# Patient Record
Sex: MALE | Race: WHITE | URBAN - METROPOLITAN AREA
[De-identification: names, ages, dates, MRNs, and addresses within clinical notes are randomized per-mention and may not be internally consistent; named-entity substitution may affect disease eponyms.]

---

## 2019-06-11 ENCOUNTER — HOSPITAL ENCOUNTER (INPATIENT)
Facility: HOSPITAL | Age: 29
LOS: 3 days | Discharge: ANOTHER HOSPITAL | DRG: 861 | End: 2019-06-14
Attending: PHYSICAL MEDICINE & REHABILITATION | Admitting: PHYSICAL MEDICINE & REHABILITATION
Payer: COMMERCIAL

## 2019-06-11 DIAGNOSIS — I10 HTN (HYPERTENSION): Primary | ICD-10-CM

## 2019-06-11 PROBLEM — B19.20 HCV (HEPATITIS C VIRUS): Status: ACTIVE | Noted: 2019-06-11

## 2019-06-11 PROBLEM — I48.91 A-FIB (HCC): Status: ACTIVE | Noted: 2019-06-11

## 2019-06-11 PROBLEM — Z98.890 HX OF TRACHEOSTOMY: Status: ACTIVE | Noted: 2019-06-11

## 2019-06-11 PROBLEM — M62.82 RHABDOMYOLYSIS: Status: ACTIVE | Noted: 2019-06-11

## 2019-06-11 PROBLEM — D75.839 THROMBOCYTOSIS: Status: ACTIVE | Noted: 2019-06-11

## 2019-06-11 PROBLEM — K68.12 PSOAS ABSCESS, RIGHT (HCC): Status: ACTIVE | Noted: 2019-06-11

## 2019-06-11 PROBLEM — D64.9 ANEMIA: Status: ACTIVE | Noted: 2019-06-11

## 2019-06-11 PROBLEM — R29.898 RIGHT LEG WEAKNESS: Status: ACTIVE | Noted: 2019-06-11

## 2019-06-11 PROBLEM — M79.604 RIGHT LEG PAIN: Status: ACTIVE | Noted: 2019-06-11

## 2019-06-11 PROBLEM — D72.829 LEUKOCYTOSIS: Status: ACTIVE | Noted: 2019-06-11

## 2019-06-11 PROCEDURE — 99255 IP/OBS CONSLTJ NEW/EST HI 80: CPT | Performed by: PHYSICAL MEDICINE & REHABILITATION

## 2019-06-11 RX ORDER — POLYETHYLENE GLYCOL 3350 17 G/17G
17 POWDER, FOR SOLUTION ORAL DAILY PRN
Status: DISCONTINUED | OUTPATIENT
Start: 2019-06-11 | End: 2019-06-13

## 2019-06-11 RX ORDER — HEPARIN SODIUM 5000 [USP'U]/ML
5000 INJECTION, SOLUTION INTRAVENOUS; SUBCUTANEOUS EVERY 8 HOURS SCHEDULED
Status: DISCONTINUED | OUTPATIENT
Start: 2019-06-11 | End: 2019-06-14 | Stop reason: HOSPADM

## 2019-06-11 RX ORDER — OXYCODONE HYDROCHLORIDE 5 MG/1
10 TABLET ORAL EVERY 4 HOURS PRN
Status: DISCONTINUED | OUTPATIENT
Start: 2019-06-11 | End: 2019-06-14 | Stop reason: HOSPADM

## 2019-06-11 RX ORDER — CLONIDINE 0.2 MG/24H
0.2 PATCH, EXTENDED RELEASE TRANSDERMAL WEEKLY
Status: DISCONTINUED | OUTPATIENT
Start: 2019-06-17 | End: 2019-06-14 | Stop reason: HOSPADM

## 2019-06-11 RX ORDER — OXYCODONE HYDROCHLORIDE 5 MG/1
5 TABLET ORAL EVERY 4 HOURS PRN
Status: DISCONTINUED | OUTPATIENT
Start: 2019-06-11 | End: 2019-06-14 | Stop reason: HOSPADM

## 2019-06-11 RX ORDER — GABAPENTIN 300 MG/1
300 CAPSULE ORAL 3 TIMES DAILY
Status: DISCONTINUED | OUTPATIENT
Start: 2019-06-11 | End: 2019-06-14 | Stop reason: HOSPADM

## 2019-06-11 RX ADMIN — OXYCODONE HYDROCHLORIDE 10 MG: 5 TABLET ORAL at 19:18

## 2019-06-11 RX ADMIN — GABAPENTIN 300 MG: 300 CAPSULE ORAL at 21:26

## 2019-06-11 RX ADMIN — METOPROLOL TARTRATE 25 MG: 25 TABLET, FILM COATED ORAL at 21:26

## 2019-06-11 RX ADMIN — HEPARIN SODIUM 5000 UNITS: 5000 INJECTION, SOLUTION INTRAVENOUS; SUBCUTANEOUS at 21:26

## 2019-06-11 RX ADMIN — DAPTOMYCIN 575 MG: 500 INJECTION, POWDER, LYOPHILIZED, FOR SOLUTION INTRAVENOUS at 20:11

## 2019-06-11 NOTE — ASSESSMENT & PLAN NOTE
- on clonidine 0 2 mg TD patch qweekly (arrived with patch dated 6/10)  - on lopressor 25 mg q12 (for a-fib)

## 2019-06-11 NOTE — PLAN OF CARE
Problem: Potential for Falls  Goal: Patient will remain free of falls  Description  INTERVENTIONS:  - Assess patient frequently for physical needs  -  Identify cognitive and physical deficits and behaviors that affect risk of falls    -  [place] fall precautions as indicated by assessment   - Educate patient/family on patient safety including physical limitations  - Instruct patient to call for assistance with activity based on assessment  - Modify environment to reduce risk of injury  - Consider OT/PT consult to assist with strengthening/mobility  Outcome: Progressing     Problem: PAIN - ADULT  Goal: Verbalizes/displays adequate comfort level or baseline comfort level  Description  Interventions:  - Encourage patient to monitor pain and request assistance  - Assess pain using appropriate pain scale  - Administer analgesics based on type and severity of pain and evaluate response  - Implement non-pharmacological measures as appropriate and evaluate response  - Consider cultural and social influences on pain and pain management  - Notify physician/advanced practitioner if interventions unsuccessful or patient reports new pain  Outcome: Progressing

## 2019-06-11 NOTE — ASSESSMENT & PLAN NOTE
-blood cx orderd by IM which is pending  - 16 2 per acute care records on 6/6; currently stable at 15 5   - with peritoneal & retroperitoneal / right iliopsoas abscesses   - and based on radiology report from [facility] of [address] A/P of 5/31 patient may have mediastinal abscess as well and FU CT chest was recommended however this was not done at [facility]; additionally the report mentions a possible rt empyema however per d/w Dr Krista Moreno of ID at [facility] patient had rt thoracentesis on 6/5 which did not grow any organisms in culture  - with MRSA bacteremia while in acute care   - s/p irrigation, evacuation, and drain placement on 5/8 by Dr Ev Pearson of iliopsosas abscess; per acute care records patient will need FU with IR for drain check approximately 6/25  - per d/w Dr Richard Mendoza of ID recommend continue with daptomycin 10 mg/kg/day until 6/28 (started on abx on 5/6 and confirmed with pharmacy that 10 mg/kg/day does not exceed the maximum dose) and check CT C/A/P to evaluate possible mediastinal abscess as well as known peritoneal & retroperitoneal abscesses (d/w radiology and recommends CT with both oral and IV contrast to evaluate for abscesses and patient currently with eGFR of 161) which revealed multiple lung/subpleural nodules, multiple fluid collections like infective located in the retroperitoneal, peritoneal, thoracic space (including but not limited to around the liver, heart, and lungs)--> discussed these findings with Dr Richard Mendoza of ID at [facility] and IM here at [address] and all are in agreement patient should be transferred back to [facility] acute care

## 2019-06-11 NOTE — ASSESSMENT & PLAN NOTE
-occurred in acute care and present on admission to rehab unit   - patient with 1/5 RLE strength proximally and right foot drop  - PROM dorsiflexion to a few degrees shy of neutral (ROM and multipodis boot to prevent further contracture)   - etiology likely multifactorial due to a combination of patient with RLE pain (primarily at the right foot) and is s/p irrigation, evacuation, and drain placement for a right psoas abscess and initially presented with rhabdomyolysis after being found down (likely was on his right side) having had a prolonged hospital course is likely deconditioned as well  -given that patient has an abscess in the illiopsoas region patient could also have a lumbar plexopathy as the etiology however MRI L spine of 5/26 at [facility] showed no disc herneations, central stenosis, foraminal stenosis L1 through S1 but report did note an "Abnormal enhancement extends adjacent to multiple right-sided lumbar neural foramina " therefore this finding was d/w the radiologist at [facility] who felt while there was no compression and no discrete fluid collection that could be drained there was evidence of that the aforementioned abscess associated fluid by extending to the lumbar neural foramina (particularly L2-L4) could be causing inflammation and patient's weakness   - t/c EMG/NCS if improvement does not occur (not available as inpt at this facility)

## 2019-06-11 NOTE — ASSESSMENT & PLAN NOTE
- evaluated by cardiology in acute care and started on lopressor 25 mg q12 but not AC based on MALI score of 0 per cards note from acute care  - d/w IM and while A-fib may have been provoked by illness in acute care recommend that patient be continued on lopressor 25 mg q12 at this time

## 2019-06-11 NOTE — ASSESSMENT & PLAN NOTE
- likely component of ABLA  - Hg 7 9 per acute care records on 6/6 currently stable at 8 4  -pending tx back to [facility] acute care for infxn managment

## 2019-06-11 NOTE — H&P
PHYSICAL MEDICINE AND REHABILITATION H&P/ADMISSION NOTE  Reynaldo Lee 25 y o  male MRN: [id number]  Unit/Bed#: [facility] 221-01 Encounter: [id number]     Rehab Diagnosis: debility     History of Present Illness:   Reynaldo Lee is a 25 y o  male who presented after being found down and was found to have rhabdomyolysis and TONY necessitating HD, additionally patient's course complicated by MRSA bacteremia and psoas abscess necessitating abx as well as irrigation, evacuation, and drain placement; patient also developed respiratory failure and had to be intubated and eventually had tracheostomy placed and was eventually decanulated  Subjective: Patient c/o right foot pain     Review of Systems: A 10-point review of systems was performed  Negative except as listed above  Plan:      Thrombocytosis ([address] )  Assessment & Plan  - likely reactive  - per acute care records 518 on 6/6; recheck in AM     Anemia  Assessment & Plan  - likely component of ABLA  - Hg 7 9 per acute care records on 6/6; recheck in AM     Leukocytosis  Assessment & Plan  - likely 2/2 to infxn  - 16 2 per acute care records on 6/6; recheck in AM     Right leg weakness  Assessment & Plan  - patient with 1/5 RLE strength proximally and right foot drop  -occurred during acute care and present on admission to rehab unit  - etiology likely multifactorial due to a combination of patient with RLE pain (primarily at the right foot) and is s/p irrigation, evacuation, and drain placement for a right psoas abscess and initially presented with rhabdomyolysis after being found down (likely was on his right side) having had a prolonged hospital course is likely deconditioned as well  - PROM dorsiflexion to a few degrees shy of neutral (ROM and multipodis boot to prevent further contracture)   -MRI L spine of 5/26 at [facility] showed no disc herneations, central stenosis, foraminal stenosis L1 through S1 however report did note an "Abnormal enhancement extends adjacent to multiple right-sided lumbar neural foramina ", additionally given that patient has an abscess in the illiopsoas region patient could also have a lumbar plexopathy as the etiology   - t/c EMG/NCS if does not improve (not available at this facility)     Right leg pain  Assessment & Plan  - primarily located at foot with neuropathic description of pain by patient   - unclear etiology but may have preceded hospitalization   - no obvious deformity, trophic changes, vasomotor abnormalities, or sudomotor abnormalities but patient does display allodynia and has weakness and decreased ROM at the ankle but no h/o of direct trauma to that area but did have a right psoas abscess for which underwent a procedure further proximally to that area; unclear if this is early CRPS vs a peripheral neuropathy vs a radicular pain syndrome vs lumbar plexopathy  -MRI L spine of 5/26 at [facility] showed no disc herneations, central stenosis, foraminal stenosis L1 through S1 however report did note an "Abnormal enhancement extends adjacent to multiple right-sided lumbar neural foramina ", additionally given that patient has an abscess in the illiopsoas region patient could also have a lumbar plexopathy as the etiology   - on gabapentin which was started in acute care and will uptitrate as needed  - also on prn oxycodone which was also started on acute care however after d/w patient this is of unclear benefit and given h/o substance abuse there is concern for drug seeking       HCV (hepatitis C virus)  Assessment & Plan  - seen by ID in acute care and recommended OP FU  - per acute care records AST/ALT/AP/total & direct bilirubin were 23/13/88/0 6/0 3 which are all WNL per their reference ranges  - will recheck in AM     Hx of tracheostomy  Assessment & Plan  - tracheostomy performed by Dr Toby Burleson on 5/9  - now decanulated   - site C/D/I    Rhabdomyolysis  Assessment & Plan  - occurred on presentation to acute care with TONY requiring HD however per acute care records renal fxn now with Cr of 0 5 and eGFR/GFR >60 and CK of 31 (wnl per acute care reference ranges)  - will recheck Cr, eGFR/GFR and CK in AM     Iliopsoas abscess, right ([facility] )  Assessment & Plan  - with MRSA bacteremia   - s/p irrigation, evacuation, and drain placement on 5/8 by Dr Payton Nicole; per acute care records patient will need FU with IR for drain check approximately 6/25  - per acute care notes daptomycin 10 mg/kg/day until 6/28 (started on abx on 5/6); confirmed with pharmacy that 10 mg/kg/day does not exceed the maximum dose   - per imaging report from acute care records patient may have other retroperitoneal & peritoneal abscesses and possibly mediastinal abscess as well, will d/w IM     A-fib ([facility] [address] [facility] )  Assessment & Plan  - occurred in the setting of illness in acute care   - evaluated by cardiology in acute care and started on lopressor 25 mg q12 but not AC based on MALI score of 0 per cards note from acute care  -will d/w IM     * HTN (hypertension)  Assessment & Plan  - on clonidine 0 2 mg TD patch qweekly (arrived with patch dated 6/10)  - on lopressor 25 mg q12 (for a-fib)  - IM managing     Drug regimen reviewed, all potential adverse effects identified and addressed:    Scheduled Meds:    Current Facility-Administered Medications:  [START ON 6/17/2019] cloNIDine 0 2 mg Transdermal Weekly Obdulia Baer MD   DAPTOmycin 10 mg/kg Intravenous Q24H Obdulia Baer MD   gabapentin 300 mg Oral TID Obdulia Baer MD   heparin (porcine) 5,000 Units Subcutaneous Q8H Samuel Alonso MD   metoprolol tartrate 25 mg Oral Q12H [address] Obdulia Baer MD   oxyCODONE 10 mg Oral Q4H PRN Obdulia Baer MD   oxyCODONE 5 mg Oral Q4H PRN Obdulia Baer MD   polyethylene glycol 17 g Oral Daily PRN Obdulia Baer MD              Functional History - Prior to Admission:       I PTA     Functional Status Upon Admission to ARC:  Mobility: mod  Transfers: mod  ADLs: mod     Social Hx:  Patient incarcerated      Physical Exam:          General: alert, no apparent distress, cooperative and comfortable  HEENT:  hx of tracheostomy s/p decanulation site C/D/I  CARDIAC:  +S1/2  LUNGS:  respirations unlabored  ABDOMEN:  + RLQ drain in place   EXTREMITIES:  no significant LE edema  NEURO:   mental status, speech normal, alert and oriented x3, cranial nerves 2-12 intact, sensation grossly normal, finger to nose and cerebellar exam normal and 4/5 UE B/L, 4/5 LLE, 1/5 RLE proximally with Rt foot drop  PSYCH:  mood/affect currently stable        Past Medical History:   Past Surgical History:   Family History:   Social history:   No past medical history on file  No past surgical history on file  No family history on file     Social History     Socioeconomic History    Marital status: Unknown     Spouse name: Not on file    Number of children: Not on file    Years of education: Not on file    Highest education level: Not on file   Occupational History    Not on file   Social Needs    Financial resource strain: Not on file    Food insecurity:     Worry: Not on file     Inability: Not on file    Transportation needs:     Medical: Not on file     Non-medical: Not on file   Tobacco Use    Smoking status: Not on file   Substance and Sexual Activity    Alcohol use: Not on file    Drug use: Not on file    Sexual activity: Not on file   Lifestyle    Physical activity:     Days per week: Not on file     Minutes per session: Not on file    Stress: Not on file   Relationships    Social connections:     Talks on phone: Not on file     Gets together: Not on file     Attends Buddhist service: Not on file     Active member of club or organization: Not on file     Attends meetings of clubs or organizations: Not on file     Relationship status: Not on file    Intimate partner violence:     Fear of current or ex partner: Not on file     Emotionally abused: Not on file     Physically abused: Not on file     Forced sexual activity: Not on file   Other Topics Concern    Not on file   Social History Narrative    Not on file          Current Medical Diagnosis Allergies   Patient Active Problem List   Diagnosis    HTN (hypertension)    A-fib ([facility] [address]    Psoas abscess, right ([facility] [address] )    Rhabdomyolysis    Hx of tracheostomy    HCV (hepatitis C virus)    Leukocytosis    Anemia    Thrombocytosis (HCC)    Right leg pain    Right leg weakness    Allergies   Allergen Reactions    Elavil [Amitriptyline]     Pepto-Bismol [Bismuth Subsalicylate] Hives    Restoril [Temazepam]            Medical Necessity Criteria for ARC Admission: Leukocytosis, abscess  In addition, the preadmission screen, post-admission physical evaluation, overall plan of care and admissions order demonstrate a reasonable expectation that the following criteria were met at the time of admission to the [facility]  1  The patient requires active and ongoing therapeutic intervention of multiple therapy disciplines (physical therapy, occupational therapy, speech-language pathology, or prosthetics/orthotics), one of which is physical or occupational therapy  2  Patient requires an intensive rehabilitation therapy program, as defined in Chapter 1, section 110 2 2 of the CMS Medicare Policy Manual  This intensive rehabilitation therapy program will consist of at least 3 hours of therapy per day at least 5 days per week or at least 15 hours of intensive rehabilitation therapy within a 7 consecutive day period, beginning with the date of admission to the [facility]  3  The patient is reasonably expected to actively participate in, and benefit significantly from, the intensive rehabilitation therapy program as defined in Chapter 1, section 110 2 2 of the CMS Medicare Policy Manual at this time of admission to the [facility]   He can reasonably be expected to make measurable improvement (that will be of practical value to improve the patients functional capacity or adaptation to impairments) as a result of the rehabilitation treatment, as defined in section 110 3, and such improvement can be expected to be made within the prescribed period of time  As noted in the CMS Medicare Policy Manual, the patient need not be expected to achieve complete independence in the domain of self-care nor be expected to return to his or her prior level of functioning in order to meet this standard  4  The patient must require physician supervision by a rehabilitation physician  As such, a rehabilitation physician will conduct face-to-face visits with the patient at least 3 days per week throughout the patients stay in the [facility] to assess the patient both medically and functionally, as well as to modify the course of treatment as needed to maximize the patients capacity to benefit from the rehabilitation process  5  The patient requires an intensive and coordinated interdisciplinary approach to providing rehabilitation, as defined in Chapter 1, section 110 2 5 of the CMS Medicare Policy Manual  This will be achieved through periodic team conferences, conducted at least once in a 7-day period, and comprising of an interdisciplinary team of medical professionals consisting of: a rehabilitation physician, registered nurse,  and/or , and a licensed/certified therapist from each therapy discipline involved in treating the patient  Changes Since Pre-admission Assessment: None -This patient's participation in rehab continues to be reasonable, necessary and appropriate  CMS Required Post-Admission Physician Evaluation Elements  History and Physical, including medical history, functional history and active comorbidities as in above text      PostAdmission Physician Evaluation:  The patient has the potential to make improvement and is in need of physical, occupational, and/or therapy services  The patient may also need nutritional services   Given the patient's complex medical condition and risk of further medical complications, rehabilitative services cannot be safely provided at a lower level of care, such as a skilled nursing facility  I have reviewed the patient's functional and medical status at the time of the preadmission screening and they are the same as on the day of this admission  I acknowledge that I have personally performed a full physical examination on this patient within 24 hours of admission   The patient and/or family demonstrated understanding the rehabilitation program and the discharge process after we discussed them      Agree in entirety: yes  Minor adaptions: none    Major changes: none     Gagandeep Breen MD  Physical Medicine and Rehabilitation

## 2019-06-11 NOTE — ASSESSMENT & PLAN NOTE
- likely reactive  - currently 80  -pending tx back to [facility] acute care for infxn managment

## 2019-06-11 NOTE — PROGRESS NOTES
06/11/19 1706   Charting Type   Charting Type Admission   Neurological   Neuro (WDL) X   Level of Consciousness Alert/awake   Orientation Level Oriented X4   Cognition Follows commands   Extraocular Movements Full   Right Upper Visual Fields Intact   Left Upper Visual Fields Intact   Right Lower Visual Fields Intact   Left Lower Visual Fields Intact   Facial Symmetry Symmetrical   Tongue Deviation Midline   Speech Clear; Appropriate for developmental age   [de-identified] Able to swallow solids and liquids without difficulty   Muscle Function/Sensation Assessment ;Muscle strength   R Hand  Moderate   L Hand  Moderate   RUE Muscle Strength 5- Normal strength   LUE Muscle Strength 5- Normal strength   RLE Muscle Strength 2- Movement with gravity eliminated   LLE Muscle Strength 5- Normal strength   HEENT   HEENT (WDL) X   Head and Face Symmetrical   R Eye Intact   L Eye Intact   R Ear Intact   L Ear Intact   Nose Intact   Lips Symmetrical   Throat Intact   Tongue Pink & moist   Voice Hoarse   Mucous Membrane(s) Moist;Pink; Intact   Teeth Missing teeth;Poor dental hygiene   Neck Trauma/injury  (old trach opening)   Respiratory   Respiratory (WDL) X   Respiratory Pattern Normal   Chest Assessment Chest expansion symmetrical   Bilateral Breath Sounds Clear;Diminished   R Breath Sounds Clear;Diminished   L Breath Sounds Clear;Diminished   Cardiac   Cardiac (WDL) WDL   Peripheral Vascular   Peripheral Vascular (WDL) WDL   Integumentary   Integumentary (WDL) X   Skin Condition/Temp Dry   Skin Integrity Cracking   Skin Location B/L feet   Skin Turgor Non-tenting   Integumentary Additional Assessments No   2 RN Skin Assessment completed with Tianna AVILES  Tattoos/Piercings   Does patient have tattoos?  No   Piercings Remaining No   Bj Scale   Sensory Perceptions 3   Moisture 4   Activity 2   Mobility 3   Nutrition 3   Friction and Shear 1   Bj Scale Score 16   Wound 06/11/19 Pressure Injury Sacrum   Date First Assessed/Time First Assessed: 06/11/19 1800   Pre-Existing Wound: Yes  Primary Wound Type: Pressure Injury  Location: Sacrum  Wound Description (Comments): red, nonblanchable   Wound Description Beefy red;Non-blanchable erythema   Staging Stage I   Eleanor-wound Assessment Clean;Dry; Intact   Wound Length (cm) 4 cm   Wound Width (cm) 4 cm   Wound Depth (cm) 0   Calculated Wound Area (cm^2) 16 cm^2   Calculated Wound Volume (cm^3) 0 cm^3   Drainage Amount None   Treatments Cleansed   Dressing Foam, Silicon (eg  Allevyn, etc)   Dressing Changed Changed   Patient Tolerance Tolerated well   Dressing Status Clean;Dry; Intact   Wound 06/11/19 Pressure Injury Heel Right   Date First Assessed/Time First Assessed: 06/11/19 1800   Pre-Existing Wound: Yes  Primary Wound Type: Pressure Injury  Location: Heel  Wound Location Orientation: Right  Wound Description (Comments): SDTI   Wound Description Beefy red;Dark edges   Staging Deep Tissue Injury   Wound Length (cm) 3 5 cm   Wound Width (cm) 3 5 cm   Wound Depth (cm) 0   Calculated Wound Area (cm^2) 12 25 cm^2   Calculated Wound Volume (cm^3) 0 cm^3   Drainage Amount None   Treatments Cleansed;Elevated   Dressing Protective barrier   Patient Tolerance Tolerated well   Wound 06/11/19 Incision Catheter entry/exit site Abdomen Right; Lower;Quadrant   Date First Assessed/Time First Assessed: 06/11/19 1800   Pre-Existing Wound: Yes  Primary Wound Type: Incision  Traumatic Wound Type: Catheter entry/exit site  Location: Abdomen  Wound Location Orientation: Right; Lower;Quadrant   Wound Description Beefy red   Eleanor-wound Assessment Clean;Dry; Intact   Closure Surface sutures   Drainage Amount Scant   Drainage Description Serosanguineous   Treatments Cleansed   Dressing Dry dressing   Dressing Changed Changed   Patient Tolerance Tolerated well   Dressing Status Clean;Dry; Intact   Musculoskeletal   Musculoskeletal (WDL) X   Level of Assistance Moderate assist, patient does 50-74%   RUE Full movement   LUE Full movement   RLE Limited movement   LLE Full movement   Gastrointestinal   Gastrointestinal (WDL) WDL   Last BM Date 06/10/19   Admission Bowel Assessment   Usual Pattern Every other day   Medications used at home None   Problems prior to admission? No   Bowel Shift Assessment   Assistance Needed None   Bowel Management Moderate assistance   Genitourinary   Genitourinary (WDL) WDL   Admission Bladder Assessment   Bladder Problems Prior to Admission? No   Bladder History No problems   Bladder Device Used at Home None   Bladder Shift Assessment   Bladder Device Urinal   Bladder Incontinence No   Bladder Management Minimal assistance   Anal/Rectal   Anal/Rectal (WDL) WDL   Psychosocial   Psychosocial (WDL) WDL   Orientated to facility  Call bell reviewed and questions addressed

## 2019-06-11 NOTE — ASSESSMENT & PLAN NOTE
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

## 2019-06-11 NOTE — ASSESSMENT & PLAN NOTE
- primarily located at foot with neuropathic description of pain by patient   - unclear etiology but may have preceded hospitalization   - no obvious deformity, trophic changes, vasomotor abnormalities, or sudomotor abnormalities but patient does display allodynia and has weakness and decreased ROM at the ankle but no h/o of direct trauma to that area but did have a right psoas abscess for which underwent a procedure further proximally to that area; unclear if this is early CRPS vs a peripheral neuropathy vs a radicular pain syndrome vs lumbar plexopathy  -MRI L spine of 5/26 at [facility] showed no disc herneations, central stenosis, foraminal stenosis L1 through S1 but report did note an "Abnormal enhancement extends adjacent to multiple right-sided lumbar neural foramina " therefore this finding was d/w the radiologist at [facility] who felt while there was no compression and no discrete fluid collection that could be drained there was evidence of that the aforementioned abscess associated fluid by extending to the lumbar neural foramina (particularly L2-L4) could be causing inflammation and patient's pain  - on gabapentin which was started in acute care and will uptitrate as needed  - also on prn oxycodone which was also started on acute care however after d/w patient this is of unclear benefit and given h/o substance abuse there is concern for drug seeking

## 2019-06-11 NOTE — ASSESSMENT & PLAN NOTE
- seen by ID in acute care and recommended OP FU  - per acute care records AST/ALT/AP/total & direct bilirubin were 23/13/88/0 6/0 3 which are all WNL per their reference ranges  - recheck on 6/12 revealed AST/ALT/AP/total & direct bilirubin were all WNL

## 2019-06-12 ENCOUNTER — APPOINTMENT (INPATIENT)
Dept: CT IMAGING | Facility: HOSPITAL | Age: 29
DRG: 861 | End: 2019-06-12
Payer: COMMERCIAL

## 2019-06-12 PROBLEM — R50.9 FEVER: Status: ACTIVE | Noted: 2019-06-11

## 2019-06-12 PROBLEM — K68.19: Status: ACTIVE | Noted: 2019-06-11

## 2019-06-12 PROBLEM — R74.8 ELEVATED LIPASE: Status: ACTIVE | Noted: 2019-06-12

## 2019-06-12 PROBLEM — E83.42 HYPOMAGNESEMIA: Status: ACTIVE | Noted: 2019-06-12

## 2019-06-12 LAB
ALBUMIN SERPL BCP-MCNC: 2.6 G/DL (ref 3.5–5.7)
ALP SERPL-CCNC: 69 U/L (ref 40–150)
ALT SERPL W P-5'-P-CCNC: 8 U/L (ref 7–52)
ANION GAP SERPL CALCULATED.3IONS-SCNC: 7 MMOL/L (ref 4–13)
ANISOCYTOSIS BLD QL SMEAR: PRESENT
AST SERPL W P-5'-P-CCNC: 13 U/L (ref 13–39)
BASOPHILS # BLD AUTO: 0.2 THOUSANDS/ΜL (ref 0–0.1)
BASOPHILS NFR BLD AUTO: 1 % (ref 0–2)
BILIRUB DIRECT SERPL-MCNC: 0.1 MG/DL (ref 0–0.2)
BILIRUB SERPL-MCNC: 0.3 MG/DL (ref 0.2–1)
BUN SERPL-MCNC: 9 MG/DL (ref 7–25)
CALCIUM SERPL-MCNC: 9.2 MG/DL (ref 8.6–10.5)
CHLORIDE SERPL-SCNC: 96 MMOL/L (ref 98–107)
CK SERPL-CCNC: 15 U/L (ref 30–308)
CO2 SERPL-SCNC: 31 MMOL/L (ref 21–31)
CREAT SERPL-MCNC: 0.39 MG/DL (ref 0.7–1.3)
EOSINOPHIL # BLD AUTO: 0.3 THOUSAND/ΜL (ref 0–0.61)
EOSINOPHIL NFR BLD AUTO: 2 % (ref 0–5)
ERYTHROCYTE [DISTWIDTH] IN BLOOD BY AUTOMATED COUNT: 17.8 % (ref 11.5–14.5)
GFR SERPL CREATININE-BSD FRML MDRD: 161 ML/MIN/1.73SQ M
GLUCOSE SERPL-MCNC: 87 MG/DL (ref 65–99)
HCT VFR BLD AUTO: 25.6 % (ref 42–47)
HGB BLD-MCNC: 8.4 G/DL (ref 14–18)
HYPERCHROMIA BLD QL SMEAR: PRESENT
LYMPHOCYTES # BLD AUTO: 1.7 THOUSANDS/ΜL (ref 0.6–4.47)
LYMPHOCYTES NFR BLD AUTO: 11 % (ref 21–51)
MAGNESIUM SERPL-MCNC: 1.8 MG/DL (ref 1.9–2.7)
MCH RBC QN AUTO: 29 PG (ref 26–34)
MCHC RBC AUTO-ENTMCNC: 32.7 G/DL (ref 31–37)
MCV RBC AUTO: 89 FL (ref 81–99)
MONOCYTES # BLD AUTO: 1.1 THOUSAND/ΜL (ref 0.17–1.22)
MONOCYTES NFR BLD AUTO: 7 % (ref 2–12)
NEUTROPHILS # BLD AUTO: 12.2 THOUSANDS/ΜL (ref 1.4–6.5)
NEUTS SEG NFR BLD AUTO: 79 % (ref 42–75)
PLATELET # BLD AUTO: 609 THOUSANDS/UL (ref 149–390)
PLATELET BLD QL SMEAR: ABNORMAL
PMV BLD AUTO: 8.2 FL (ref 8.6–11.7)
POTASSIUM SERPL-SCNC: 3.9 MMOL/L (ref 3.5–5.5)
PROT SERPL-MCNC: 8.8 G/DL (ref 6.4–8.9)
RBC # BLD AUTO: 2.88 MILLION/UL (ref 4.3–5.9)
RBC MORPH BLD: PRESENT
SODIUM SERPL-SCNC: 134 MMOL/L (ref 134–143)
WBC # BLD AUTO: 15.5 THOUSAND/UL (ref 4.8–10.8)

## 2019-06-12 PROCEDURE — 97110 THERAPEUTIC EXERCISES: CPT

## 2019-06-12 PROCEDURE — 85025 COMPLETE CBC W/AUTO DIFF WBC: CPT | Performed by: PHYSICAL MEDICINE & REHABILITATION

## 2019-06-12 PROCEDURE — 99232 SBSQ HOSP IP/OBS MODERATE 35: CPT | Performed by: PHYSICAL MEDICINE & REHABILITATION

## 2019-06-12 PROCEDURE — 97162 PT EVAL MOD COMPLEX 30 MIN: CPT

## 2019-06-12 PROCEDURE — 99255 IP/OBS CONSLTJ NEW/EST HI 80: CPT | Performed by: INTERNAL MEDICINE

## 2019-06-12 PROCEDURE — 97530 THERAPEUTIC ACTIVITIES: CPT

## 2019-06-12 PROCEDURE — 87040 BLOOD CULTURE FOR BACTERIA: CPT | Performed by: INTERNAL MEDICINE

## 2019-06-12 PROCEDURE — 80053 COMPREHEN METABOLIC PANEL: CPT | Performed by: PHYSICAL MEDICINE & REHABILITATION

## 2019-06-12 PROCEDURE — 97542 WHEELCHAIR MNGMENT TRAINING: CPT

## 2019-06-12 PROCEDURE — 82248 BILIRUBIN DIRECT: CPT | Performed by: PHYSICAL MEDICINE & REHABILITATION

## 2019-06-12 PROCEDURE — 97167 OT EVAL HIGH COMPLEX 60 MIN: CPT

## 2019-06-12 PROCEDURE — 71260 CT THORAX DX C+: CPT

## 2019-06-12 PROCEDURE — 82550 ASSAY OF CK (CPK): CPT | Performed by: PHYSICAL MEDICINE & REHABILITATION

## 2019-06-12 PROCEDURE — 97535 SELF CARE MNGMENT TRAINING: CPT

## 2019-06-12 PROCEDURE — 74177 CT ABD & PELVIS W/CONTRAST: CPT

## 2019-06-12 PROCEDURE — 83735 ASSAY OF MAGNESIUM: CPT | Performed by: PHYSICAL MEDICINE & REHABILITATION

## 2019-06-12 RX ADMIN — OXYCODONE HYDROCHLORIDE 10 MG: 5 TABLET ORAL at 01:05

## 2019-06-12 RX ADMIN — METOPROLOL TARTRATE 25 MG: 25 TABLET, FILM COATED ORAL at 08:34

## 2019-06-12 RX ADMIN — OXYCODONE HYDROCHLORIDE 10 MG: 5 TABLET ORAL at 16:06

## 2019-06-12 RX ADMIN — ALTEPLASE 2 MG: 2.2 INJECTION, POWDER, LYOPHILIZED, FOR SOLUTION INTRAVENOUS at 15:27

## 2019-06-12 RX ADMIN — IOHEXOL 50 ML: 240 INJECTION, SOLUTION INTRATHECAL; INTRAVASCULAR; INTRAVENOUS; ORAL at 16:00

## 2019-06-12 RX ADMIN — OXYCODONE HYDROCHLORIDE 10 MG: 5 TABLET ORAL at 05:27

## 2019-06-12 RX ADMIN — HEPARIN SODIUM 5000 UNITS: 5000 INJECTION, SOLUTION INTRAVENOUS; SUBCUTANEOUS at 14:21

## 2019-06-12 RX ADMIN — GABAPENTIN 300 MG: 300 CAPSULE ORAL at 20:21

## 2019-06-12 RX ADMIN — METOPROLOL TARTRATE 25 MG: 25 TABLET, FILM COATED ORAL at 20:21

## 2019-06-12 RX ADMIN — GABAPENTIN 300 MG: 300 CAPSULE ORAL at 16:06

## 2019-06-12 RX ADMIN — HEPARIN SODIUM 5000 UNITS: 5000 INJECTION, SOLUTION INTRAVENOUS; SUBCUTANEOUS at 05:15

## 2019-06-12 RX ADMIN — IOHEXOL 100 ML: 350 INJECTION, SOLUTION INTRAVENOUS at 18:57

## 2019-06-12 RX ADMIN — GABAPENTIN 300 MG: 300 CAPSULE ORAL at 08:34

## 2019-06-12 RX ADMIN — OXYCODONE HYDROCHLORIDE 10 MG: 5 TABLET ORAL at 10:57

## 2019-06-12 RX ADMIN — OXYCODONE HYDROCHLORIDE 10 MG: 5 TABLET ORAL at 20:30

## 2019-06-12 RX ADMIN — HEPARIN SODIUM 5000 UNITS: 5000 INJECTION, SOLUTION INTRAVENOUS; SUBCUTANEOUS at 21:09

## 2019-06-12 RX ADMIN — DAPTOMYCIN 575 MG: 500 INJECTION, POWDER, LYOPHILIZED, FOR SOLUTION INTRAVENOUS at 20:21

## 2019-06-12 NOTE — ASSESSMENT & PLAN NOTE
- per acute care records 1 2 on 5/18; recheck 1 8 however will defer to [address] to start Mg supplementation as patient is pending tx back to [facility] for management of infxn

## 2019-06-12 NOTE — TREATMENT PLAN
Individualized Plan of [address] 25 y o  male MRN: [id number]  Unit/Bed#: -01 Encounter: [id number]     PATIENT INFORMATION  ADMISSION DATE: 6/11/2019  5:25 PM JOSH CATEGORY: debility    ADMISSION DIAGNOSIS: Neurological symptoms [R29 90]  EXPECTED LOS: 1-2 wks      MEDICAL/FUNCTIONAL PROGNOSIS  Based on my assessment of the patient's medical conditions and current functional status, the prognosis for attaining medical and functional goals or the IRF stay is:  fair    Medical Goals: pain control     ANTICIPATED DISCHARGE DISPOSITION AND SERVICES  COMMUNITY SETTING: PT/OT/Speech     ANTICIPATED FOLLOW-UP SERVICE:   Outpatient Therapy Services: PT/OT    DISCIPLINE SPECIFIC PLANS:  Required Disciplines & Services: PT/OT    REQUIRED THERAPY:  Therapy Hours per Day Days per Week Total Days   Physical Therapy 1 5 5 10   Occupational Therapy 1 5 5 10   Speech/Language Therapy 0 0 0   NOTE: Additional therapy time(s) may be added as appropriate to meet patient needs and to achieve functional goals          ANTICIPATED FUNCTIONAL OUTCOMES:  ADL:   Patient will maximize level for ADLs with least restrictive device upon completion of the rehab program     Bladder/Bowel:   Patient will maximize level for bladder/bowel managment with least restrictive device upon completion of the rehab program     Transfers:   Patient will maximize level for transfers with least restrictive device upon completion of the rehab program     Locomotion:   Patient will maximize level for locomotion with least restrictive device upon completion of the rehab program     Cognitive:  patient appears to be at baseline cognitive fxn     DISCHARGE PLANNING NEEDS  Equipment needs: tbd       REHAB ANTICIPATED PARTICIPATION RESTRICTIONS:  None

## 2019-06-12 NOTE — PROGRESS NOTES
OT eval and ADL  OT treatment and documentation completed in collaboration with LARISSA Harper  Pt completed session with w/c mobility to OT gym S and with BUE therex during 27 minutes concurrent treatment focusing on similar goals of w/c mobility and UB therex as another patient with similar goals  Pt completes 30 hand squeezes with gripper and 30 up/ down yellow flex bar BUE for both activities  Pt presents following hospitalization due to rhabdo with decreased balance, strength, endurance and increased RLE pain noted  Pt will benefit from skilled OT services to increase independence with daily tasks  Precautions include contact for MRSA, PICC LUE, drain to Right lower quadrant and increased pain RLE limiting WB     06/12/19 1230   Patient Data   Rehab Impairment neurological disorder   Etiologic Diagnosis rhabdomyolysis   Home Setup   Type of Home Other  ([address] inmate)   Home Modification Comment Modifications will beneed to be addressed  Toilet in cell however will need to go to communal shower for bathing  Prior IADL Participation   Money Management   (custodial completes)   Meal Preparation   (custodial completes)   Laundry   (custodial completes)   Home Cleaning   (custodial completes)   Prior Level of Function   Self-Care 3  Independent - Patient completed the activities by him/herself, with or without an assistive device, with no assistance from a helper  Patient Preference   Nickname (Patient Preference) Georgeana Goodpasture   Restrictions/Precautions   Precautions Cognitive; Fall Risk;Limb alert;Multiple lines;Contact/isolation   Weight Bearing Restrictions No   Pain Assessment   Pain Assessment 0-10   Pain Score 8   Pain Type Acute pain   Pain Location Leg   Pain Orientation Right   QI: Eating   Assistance Needed Set-up / clean-up   Eating CARE Score 5   QI: Oral Hygiene   Assistance Needed Set-up / clean-up   Oral Hygiene CARE Score 5   Grooming   Able To Initiate Tasks;Comb/Brush Hair;Wash/Dry Face;Brush/Clean Teeth;Wash/Dry Hands   Limitation Noted In Safety   Grooming (FIM) 5 - [place] sets up supplies or applies device   QI: Shower/Bathe Self   Assistance Needed Physical assistance   Assistance Provided by [place] 25%-49%   Shower/Bathe Self CARE Score 3   Bathing   Assessed Bath Style Sponge Bath   Anticipated D/C Bath Style Shower;Sponge Bath   Able to [place] No   Able to [employer] Temperature No   Able to Wash/Rinse/Dry (body part) Left Arm;Right Arm;L Upper Leg;R Upper Leg;Chest;Abdomen;Perineal Area   Limitations Noted in Balance; Endurance; Safety   Positioning Supine   Bathing (FIM) 3 - Patient completes 5/10  6/10 or 7/10 parts   QI: Upper Body Dressing   Assistance Needed Physical assistance   Assistance Provided by [place] Less than 25%   Upper Body Dressing CARE Score 3   QI: Lower Body Dressing   Assistance Needed Physical assistance   Assistance Provided by [place] 75% or more   Lower Body Dressing CARE Score 2   QI: Putting On/Taking Off Footwear   Assistance Needed Physical assistance   Assistance Provided by [place] 50%-74%   Putting On/Taking Off Footwear CARE Score 2   QI: Picking Up Object   Reason if not Attempted Safety concerns   Picking Up Object CARE Score 88   Dressing/Undressing Clothing   Remove UB Clothes Pullover Shirt   Remove LB Clothes Pants;Socks   [address] LB Clothes Pants;Socks   Limitations Noted In Balance; Endurance; Safety;ROM;Strength; Other  (pain RLE)   UB Dressing (FIM) 4 - Patient completes 75% of all tasks   LB Dressing (FIM) 2 - Patient completes 25-49% of all tasks   QI: [address] CARE Score 4   Toileting   Toileting (FIM) 5 - [place] sets up supplies or applies device  (urinal use)   Bed Mobility   Able to Roll Left to Right;Right to Left   Findings Min A   Transfer Bed/Chair/Wheelchair   Limitations Noted In Balance;Pain Management; Endurance;LE Strength   Sit Pivot Moderate Assist Supine to Sit Moderate Assist   Sit to Supine Moderate Assist   Bed, Chair, Wheelchair Transfer (FIM) 3 - Patient completes 50 - 74% of all tasks   QI: Toilet Transfer   Reason if not Attempted Refused to perform   Toilet Transfer CARE Score 7   Tub/Shower Transfer   Not Assessed Sponge Bath;Medical   RUE Assessment   RUE Assessment WFL  (4-/5 grossly sh to hand)   LUE Assessment   LUE Assessment WFL  (4-/5 grossly sh to hand)   Coordination   Movements are Fluid and Coordinated 1   Sensation   Light Touch No apparent deficits   Propioception No apparent deficits   Cognition   Overall Cognitive Status Impaired   Arousal/Participation Alert; Responsive; Cooperative   Attention Attends with cues to redirect   Orientation Level Oriented X4   Discharge Information   Patient's Discharge Plan Return to detention   OT Therapy Minutes   OT Time In 1230   OT Time Out 1400   OT Total Time (minutes) 90   OT Mode of treatment - Individual (minutes) 63   OT Mode of treatment - Concurrent (minutes) 27

## 2019-06-12 NOTE — ASSESSMENT & PLAN NOTE
· Prolonged and complicated previous hospital course, MRSA infection resistant to vanco, currently on dapto through 6/25, s/p drain, with multiple other abscesses noted on chart    Current Antibiotics:   Dapto 5/6 - current    Prior Antibiotics:   Vanco IV 4/21 - 4/25  Zosyn IV 4/21   Dapto IV 4/26 - 5/4  Ceftaroline IV 4/30 - 5/4  Ceftaroline 5/6 - 5/15    · Patient with fever and leukocytosis currently  I have ordered repeat blood cultures, however I will defer to Infectious Disease regarding any further recommendations in light of patient's complicated course   Case discussed with Primary Team

## 2019-06-12 NOTE — CASE MANAGEMENT
Initial assessment & orientation to [facility]  Pt is a prisoner & is MA pending  He is serving a short sentence & will likely be able to be released from prison shortly after DC from [facility]  His home plan is to a detention house  SW will maintain contact with prison care coordinator Pat Diaz regarding Tx & DC planning, and will assist as needed

## 2019-06-12 NOTE — PROGRESS NOTES
Physical Medicine and Rehabilitation Progress Note  Dominic Crowe 25 y o  male MRN: [id number]  Unit/Bed#: -01 Encounter: [id number]    HPI: Dominic Crowe is a 25 y o  male who presented after being found down and was found to have rhabdomyolysis and TONY necessitating HD, additionally patient's course complicated by MRSA bacteremia and psoas abscess necessitating abx as well as irrigation, evacuation, and drain placement; patient also developed respiratory failure and had to be intubated and eventually had tracheostomy placed and was eventually decanulated            Chief Complaint: debility     Interval/subjective: patient denies any events overnight     ROS: A 10 point ROS was performed; negative except as noted above       Assessment/Plan:      Hypomagnesemia  Assessment & Plan  - per acute care records 1 2 on 5/18; recheck pending     Thrombocytosis ([facility] [address] )  Assessment & Plan  - likely reactive  - currently 609  - IM monitoring     Anemia  Assessment & Plan  - likely component of ABLA  - Hg 7 9 per acute care records on 6/6 currently stable at 8 4  -IM monitoring     Elevated lipase  Assessment & Plan  - no amylase found in records from acute care   - noted to be elevated to 147 (uln 60 at [facility])   - pateint currently asymptomatic; will d/w IM     Right leg weakness  Assessment & Plan  -occurred in acute care and present on admission to rehab unit   - patient with 1/5 RLE strength proximally and right foot drop  - PROM dorsiflexion to a few degrees shy of neutral (ROM and multipodis boot to prevent further contracture)   - etiology likely multifactorial due to a combination of patient with RLE pain (primarily at the right foot) and is s/p irrigation, evacuation, and drain placement for a right psoas abscess and initially presented with rhabdomyolysis after being found down (likely was on his right side) having had a prolonged hospital course is likely deconditioned as well  -given that patient has an abscess in the illiopsoas region patient could also have a lumbar plexopathy as the etiology however MRI L spine of 5/26 at [facility] showed no disc herneations, central stenosis, foraminal stenosis L1 through S1 but report did note an "Abnormal enhancement extends adjacent to multiple right-sided lumbar neural foramina " therefore this finding was d/w the radiologist at [facility] who felt while there was no compression and no discrete fluid collection that could be drained there was evidence of that the aforementioned abscess associated fluid by extending to the lumbar neural foramina (particularly L2-L4) could be causing inflammation and patient's weakness   - t/c EMG/NCS if improvement does not occur (not available as inpt at this facility)     Right leg pain  Assessment & Plan  - primarily located at foot with neuropathic description of pain by patient   - unclear etiology but may have preceded hospitalization   - no obvious deformity, trophic changes, vasomotor abnormalities, or sudomotor abnormalities but patient does display allodynia and has weakness and decreased ROM at the ankle but no h/o of direct trauma to that area but did have a right psoas abscess for which underwent a procedure further proximally to that area; unclear if this is early CRPS vs a peripheral neuropathy vs a radicular pain syndrome vs lumbar plexopathy  -MRI L spine of 5/26 at [facility] showed no disc herneations, central stenosis, foraminal stenosis L1 through S1 but report did note an "Abnormal enhancement extends adjacent to multiple right-sided lumbar neural foramina " therefore this finding was d/w the radiologist at [facility] who felt while there was no compression and no discrete fluid collection that could be drained there was evidence of that the aforementioned abscess associated fluid by extending to the lumbar neural foramina (particularly L2-L4) could be causing inflammation and patient's pain  - on gabapentin which was started in acute care and will uptitrate as needed  - also on prn oxycodone which was also started on acute care however after d/w patient this is of unclear benefit and given h/o substance abuse there is concern for drug seeking       HCV (hepatitis C virus)  Assessment & Plan  - seen by ID in acute care and recommended OP FU  - per acute care records AST/ALT/AP/total & direct bilirubin were 23/13/88/0 6/0 3 which are all WNL per their reference ranges  - recheck on 6/12 revealed AST/ALT/AP/total & direct bilirubin were all WNL     Hx of tracheostomy  Assessment & Plan  - tracheostomy performed by Dr Robe Pichardo on 5/9  - now decanulated   - site C/D/I    Rhabdomyolysis  Assessment & Plan  - resolved   - renal fxn WNL   - CK now actually below LLN of 30 at 15 which is likely 2/2 to disuse atrophy     Fever  Assessment & Plan  - 16 2 per acute care records on 6/6; currently stable at 15 5   - with peritoneal & retroperitoneal / right iliopsoas abscesses   - and based on radiology report from [facility] of [address] A/P of 5/31 patient may have mediastinal abscess as well and FU CT chest was recommended however this was not done at [facility]; additionally the report mentions a possible rt empyema however per d/w Dr Trista Brizuela of ID at [facility] patient had rt thoracentesis on 6/5 which did not grow any organisms in culture  - with MRSA bacteremia while in acute care   - s/p irrigation, evacuation, and drain placement on 5/8 by Dr Carlos Street of iliopsosas abscess; per acute care records patient will need FU with IR for drain check approximately 6/25  - per d/w Dr Jad Arteaga of ID recommend continue with daptomycin 10 mg/kg/day until 6/28 (started on abx on 5/6 and confirmed with pharmacy that 10 mg/kg/day does not exceed the maximum dose) and check CT C/A/P to evaluate possible mediastinal abscess as well as known peritoneal & retroperitoneal abscesses (d/w radiology and recommends CT with both oral and IV contrast to evaluate for abscesses and patient currently with eGFR of 161)         A-fib (HCC)  Assessment & Plan    - evaluated by cardiology in acute care and started on lopressor 25 mg q12 but not AC based on MALI score of 0 per cards note from acute care  - d/w IM and while A-fib may have been provoked by illness in acute care recommend that patient be continued on lopressor 25 mg q12 until OP FU with cardiology at [facility]     * HTN (hypertension)  Assessment & Plan  - on clonidine 0 2 mg TD patch qweekly (arrived with patch dated 6/10)  - on lopressor 25 mg q12 (for a-fib)  - IM managing     Scheduled Meds:    Current Facility-Administered Medications:  [START ON 6/17/2019] cloNIDine 0 2 mg Transdermal Weekly Long Wiggins MD    DAPTOmycin 10 mg/kg Intravenous Q24H Long Wiggins MD Last Rate: 575 mg (06/11/19 2011)   gabapentin 300 mg Oral TID Long Wiggins MD    heparin (porcine) 5,000 Units Subcutaneous Q8H Austen Acevedo MD    metoprolol tartrate 25 mg Oral Q12H [address] Long Wiggins MD    oxyCODONE 10 mg Oral Q4H PRN Long Wiggins MD    oxyCODONE 5 mg Oral Q4H PRN Long Wiggins MD    polyethylene glycol 17 g Oral Daily PRN Long Wiggins MD         Incidental findings:  1) elevated triglycerides: nutritionist--> dietary education; OP FU with PCP with further testing/treatment and/or specialist referral at PCP's discretion   2) L1, L5, S1, S2 vertebral body hemagiomas: no central/foraminal impingement or concern for instability/compression deformity per d/w radiologist leandro Zarate, OP FU with PCP with further testing/treatment and/or specialist referral at PCP's discretion     DVT ppx: HSQ     Objective:    Functional Update:  Mobility: PENDING  Transfers: min   ADLs: mod         Physical Exam:      General: alert, no apparent distress, cooperative and comfortable  HEENT:  hx of tracheostomy s/p decanulation   CARDIAC:  +S1/2  LUNGS:  respirations unlabored   ABDOMEN:  + RLQ drain   EXTREMITIES:  no significant LE edema  NEURO: mental status, speech normal, alert and oriented x3  PSYCH:  mood/affect currenlty stable      Laboratory:   Results from last 7 days   Lab Units 06/12/19  0533   HEMOGLOBIN g/dL 8 4*   HEMATOCRIT % 25 6*   WBC Thousand/uL 15 50*     Results from last 7 days   Lab Units 06/12/19  0533   BUN mg/dL 9   SODIUM mmol/L 134   POTASSIUM mmol/L 3 9   CHLORIDE mmol/L 96*   CREATININE mg/dL 0 39*   AST U/L 13   ALT U/L 8            Patient Active Problem List   Diagnosis    HTN (hypertension)    A-fib (HCC)    Fever    Rhabdomyolysis    Hx of tracheostomy    HCV (hepatitis C virus)    Anemia    Thrombocytosis (HCC)    Right leg pain    Right leg weakness    Hypomagnesemia    Elevated lipase

## 2019-06-12 NOTE — PROGRESS NOTES
06/12/19 1401   Pain Assessment   Pain Assessment 0-10   Pain Score 8   Pain Type Acute pain   Pain Location Leg   Pain Orientation Right   Restrictions/Precautions   Precautions Cognitive; Fall Risk   Transfer Bed/Chair/Wheelchair   Limitations Noted In Balance;Pain Management;LE Strength   Sit Pivot Minimal Assist   Sit to Supine Minimal Assist   Bed, Chair, Wheelchair Transfer (FIM) 4 - Patient completes 75% of all tasks   Therapeutic Interventions   Strengthening supine ther ex ~ AROM LLE;  AAROM/PROM RLE   Balance transfer training   Assessment   Treatment Assessment Completed supine ther ex for general LE strengthening  PT Barriers   Physical Impairment Decreased strength;Decreased range of motion;Decreased mobility; Impaired balance;Decreased safety awareness;Pain   Functional Limitation Walking;Transfers;Standing   Plan   Treatment/Interventions Functional transfer training;LE strengthening/ROM; Bed mobility   PT Therapy Minutes   PT Time In 1401   PT Time Out 1429   PT Total Time (minutes) 28   PT Mode of treatment - Individual (minutes) 28   PT Mode of treatment - Concurrent (minutes) 0   PT Mode of treatment - Group (minutes) 0   PT Mode of treatment - Co-treat (minutes) 0   PT Mode of Teatment - Total time(minutes) 28 minutes   Therapy Time missed   Time missed?  No

## 2019-06-12 NOTE — PROGRESS NOTES
06/12/19 0829   Patient Data   Rehab Impairment rhabdomyolysis   Home Setup   Type of Home Other  (correctional facility)   Method of Entry   (level)   Number of Stairs in Home 15   In Home Hand Rail Bilateral  (but far apart)   First Floor Setup Available Yes   Prior Level of Function   Indoor-Mobility (Ambulation) 3  Independent - Patient completed the activities by him/herself, with or without an assistive device, with no assistance from a helper  Stairs 3  Independent - Patient completed the activities by him/herself, with or without an assistive device, with no assistance from a helper  Pain Assessment   Pain Assessment 0-10   Pain Score 8   Pain Location Foot   Pain Orientation Right   QI: Roll Left and Right   Assistance Needed Supervision;Verbal cues   Comment bed rail   Roll Left and Right CARE Score 4   Bed Mobility   Able to Roll Left to Right;Right to Left;Scoot Up;Scoot Down   Adaptive Equipment Side Rails   Findings supervision   QI: Sit to Lying   Assistance Needed Physical assistance   Assistance Provided by [place] 25%-49%   Comment min assist   Sit to Lying CARE Score 3   QI: Lying to Sitting on Side of Bed   Assistance Needed Physical assistance   Assistance Provided by [place] 50%-74%   Comment min-mod assist   Lying to Sitting on Side of Bed CARE Score 2   QI: Sit to Stand   Assistance Needed Physical assistance   Assistance Provided by [place] Less than 25%   Comment min assist using wall rail   Sit to Stand CARE Score 3   QI: Chair/Bed-to-Chair Transfer   Assistance Needed Physical assistance   Assistance Provided by [place] 50%-74%   Comment min-mod assist with no device holding on to bed and wheelchair arm rest   Chair/Bed-to-Chair Transfer CARE Score 2   QI: Car Transfer   Reason if not Attempted Safety concerns   Car Transfer CARE Score 88   Transfer Bed/Chair/Wheelchair   Limitations Noted In Balance;Pain Management; Endurance;LE Strength   Adaptive Equipment   (w/c; wall rail; bed rail)   Sit Pivot Minimal Assist;Moderate Assist   Stand Pivot Minimal Assist;Moderate Assist   Sit to Stand Minimal   Stand to Sit Minimal   Supine to Sit Minimal;Moderate Assist   Sit to Supine Minimal   Bed, Chair, Wheelchair Transfer (FIM) 3 - Patient completes 50 - 74% of all tasks   QI: Walk 10 Feet   Reason if not Attempted Safety concerns   Walk 10 Feet CARE Score 88   QI: Walk 50 Feet with Two Turns   Reason if not Attempted Safety concerns   Walk 50 Feet with Two Turns CARE Score 88   QI: Walk 150 Feet   Reason if not Attempted Safety concerns   Walk 150 Feet CARE Score 88   QI: Walking 10 Feet on Uneven Surfaces   Reason if not Attempted Activity not applicable   Walking 10 Feet on Uneven Surfaces CARE Score 9   Ambulation   Walking (FIM) 0 - Activity does not occur   QI: Wheel 50 Feet with Two Turns   Assistance Needed Supervision   Wheel 50 Feet with Two Turns CARE Score 4   QI: Wheel 150 Feet   Assistance Needed Supervision   Wheel 150 Feet CARE Score 4   Wheelchair mobility   QI: Does the patient use a wheelchair? 1  Yes   QI: Indicate the type of wheelchair 1  Manual   Wheelchair Assist Level Supervision   Method Right upper extremity; Left upper extremity   Distance Level Surface (feet) 200 ft   Distance Wheeled 3% Grade 30 ft   Findings level and carpet   Wheelchair (FIM) 5 - Patient requires supervision/monitoring AND wheels distance 150 feet or more, no rest   QI: 1 Step (Curb)   Reason if not Attempted Safety concerns   1 Step (Curb) CARE Score 88   QI: 4 Steps   Reason if not Attempted Safety concerns   4 Steps CARE Score 88   QI: 12 Steps   Reason if not Attempted Safety concerns   12 Steps CARE Score 88   Stairs   Stairs (FIM)   (0)   Comprehension   Comprehension (FIM) 5 - Needs help/cues, repetition only RARELY ( < 10% of the time)   Expression   Expression (FIM) 6 - Expresses complex/abstract but requires:  more time   Social Interaction   Social Interaction (FIM) 5 - Interacts appropriately with others 90% of time   Problem Solving   Problem solving (FIM) 5 - Solves basic problems 90% of time   Memory   Memory (FIM) 5 - Needs cueing reminders <10%   RLE Assessment   RLE Assessment X  (hip trace mvmt; knee wfl; ankle to neutral;[facility] hip/knee)   Strength RLE   R Hip Flexion 1/5   R Hip Extension 1/5   R Hip ABduction 1/5   R Hip ADduction 1/5   R Knee Flexion 3-/5   R Knee Extension 3-/5   R Ankle Dorsiflexion 2-/5   R Ankle Plantar Flexion 2-/5   LLE Assessment   LLE Assessment X  (ROM WFL; knee extension limited by tight hamstrings)   Strength LLE   L Hip Flexion 3+/5   L Hip Extension 3+/5   L Hip ABduction 3-/5   L Hip ADduction 3-/5   L Knee Flexion 4/5   L Knee Extension 4/5   L Ankle Dorsiflexion 3+/5   L Ankle Plantar Flexion 3+/5   Coordination   Movements are Fluid and Coordinated 1   Sensation   Light Touch Partial deficits in the RLE   Propioception No apparent deficits   Cognition   Overall Cognitive Status WFL   Arousal/Participation Alert; Cooperative;Responsive   Attention Attends with cues to redirect   Orientation Level Oriented X4   Memory Within functional limits   Following Commands Follows one step commands without difficulty   Discharge Information   Impressions Patient seen for IE  Presents with decreased ROM/strength, decreased balance and safety, pain; all affecting funcrtional mobility     Would benefit from continued inpatient [facility] PT to increase function, safety, and increased independence in prep for safe d/c    PT Therapy Minutes   PT Time In 0829   PT Time Out 0931   PT Total Time (minutes) 62   PT Mode of treatment - Individual (minutes) 62   PT Mode of treatment - Concurrent (minutes) 0   PT Mode of treatment - Group (minutes) 0   PT Mode of treatment - Co-treat (minutes) 0   PT Mode of Teatment - Total time(minutes) 62 minutes

## 2019-06-12 NOTE — ASSESSMENT & PLAN NOTE
· While in the intensive care unit at [facility], approximately a month ago patient had episode of atrial flutter  Patient was treated with amiodarone digoxin and subsequently converted to sinus rhythm    · Cardiology recommended metoprolol, however did not feel that he would require any anticoagulation  · Continue current treatment for now  · Recommend close outpatient follow-up

## 2019-06-12 NOTE — CONSULTS
Consult- Shalini Garibay 1990, 25 y o  male MRN: [id number]    Unit/Bed#: -01 Encounter: [id number]    Primary Care Provider: No primary care provider on file  Date and time admitted to hospital: 6/11/2019  5:25 PM      Inpatient consult to Internal Medicine  Consult performed by: Sher Guerra MD  Consult ordered by: Obdulia Baer MD          Thrombocytosis [facility])  Assessment & Plan  · Likely reactive    HCV (hepatitis C virus)  Assessment & Plan  · Follow up with GI as outpatient    Psoas abscess, right [facility])  Assessment & Plan  · Prolonged and complicated previous hospital course, MRSA infection resistant to vanco, currently on dapto through 6/25, s/p drain, with multiple other abscesses noted on chart    Current Antibiotics:   Dapto 5/6 - current    Prior Antibiotics:   Vanco IV 4/21 - 4/25  Zosyn IV 4/21   Dapto IV 4/26 - 5/4  Ceftaroline IV 4/30 - 5/4  Ceftaroline 5/6 - 5/15    · Patient with fever and leukocytosis currently  I have ordered repeat blood cultures, however I will defer to Infectious Disease regarding any further recommendations in light of patient's complicated course  Case discussed with Primary Team    A-fib [facility])  Assessment & Plan  · While in the intensive care unit at [facility], approximately a month ago patient had episode of atrial flutter  Patient was treated with amiodarone digoxin and subsequently converted to sinus rhythm  · Cardiology recommended metoprolol, however did not feel that he would require any anticoagulation  · Continue current treatment for now  · Recommend close outpatient follow-up    * HTN (hypertension)  Assessment & Plan  · Blood pressure appears to be relatively well controlled, continue current regimen      As patient is in sinus rhythm and hypertension is well controlled, will sign off  I will defer any surgical intervention or changes to antibiotics to Infectious Disease and relevant subspecialty services   Please not hesitate to call [facility] with any questions or concerns  VTE Prophylaxis: Heparin      Recommendations for Discharge:  · Per Primary Service    Counseling / Coordination of Care Time: 95   Greater than 50% of total time spent on patient counseling and coordination of care  History of Present Illness:  Charisse Aldana is a 25 y o  male who is originally admitted to the rehab service due to debility  We are consulted for fever, hypertension, atrial flutter  Patient had a long and prolonged hospital course and [facility] which is briefly summarized below:     Patient is a 25 y o  male who was found in septic shock in detention on 4/21/2019  He was first taken to an outside hospital where he was found to have lactic acidosis to 7 5 and CPK 7800  He was resuscitated and required intubation with pressors due to hypercapnic respiratory failure  He developed MRSA bacteremia, which was refractory to vancomycin and then treated with daptomycin  Because his clinical status did not improve, he was transferred to [facility]  A CT revealed possible necrotizing fasciitis of the iliopsoas and medial thigh compartment  On 5/8/2019 he underwent retroperitoneal abscess irrigation and evacuation  Cardiology was consulted for atrial flutter, which has since been stable  On 5/9/2019, he underwent percutaneous tracheostomy tube placement  On 5/17/2019, an ultrasound guided drain was placed for his abdominal collections  With improvement of clinical status, the patient was discharged from the SICU to intermediate care unit on 5/19/2019  On 5/20/2019, his trach was downsized to a 6CFS and on 6/6/2019, his trach was de-cannulated successfully  On 6/5/2019, he underwent thoracentesis for pleural effusions and his clinical status continued to improve  The patient was tolerating diet, ambulating, voiding, and having normal bowel movements      As patient's clinical status improved, he was deemed stable for discharge to acute rehab, and I am seeing the patient in consultation in the acute rehab unit  I was asked to see the patient by nursing staff due to concerns for sepsis in light of his fever and leukocytosis  Upon my examination, patient notes that he feels well, and no change in his clinical status over the past few days  He does note feeling a bit fevers, however, denies any chest pain, shortness of breath, fevers, chills, nausea, vomiting  He is complaining of weakness particularly in his right leg but otherwise without complaints  Review of Systems:  Review of Systems   Constitutional: Positive for fatigue and fever  Gastrointestinal: Positive for abdominal pain  Musculoskeletal: Positive for gait problem  Neurological: Positive for weakness  All other systems reviewed and are negative  Past Medical and Surgical History:   No past medical history on file  No past surgical history on file  Meds/Allergies:  all medications and allergies reviewed    Allergies:    Allergies   Allergen Reactions    Elavil [Amitriptyline]     Pepto-Bismol [Bismuth Subsalicylate] Hives    Restoril [Temazepam]        Social History:     Marital Status: Unknown    Substance Use History:   Social History     Substance and Sexual Activity   Alcohol Use Yes    Frequency: 4 or more times a week    Drinks per session: Patient refused    Binge frequency: Patient refused    Comment: states would drink as much as he had     Social History     Tobacco Use   Smoking Status Current Every Day Smoker    Packs/day: 1 00    Years: 19 00    Pack years: 19 00    Types: Cigarettes   Smokeless Tobacco Never Used     Social History     Substance and Sexual Activity   Drug Use Yes    Types: Oxycodone, Heroin, Codeine       Family History:  I have reviewed the patients family history    Physical Exam:   Vitals:   Blood Pressure: 126/82 (06/12/19 0752)  Pulse: 103 (06/12/19 0752)  Temperature: 98 5 °F (36 9 °C) (06/12/19 1100)  Temp Source: Temporal (06/12/19 0433)  Respirations: 18 (06/12/19 0752)  Height: 5' 9" (175 3 cm) (06/11/19 1730)  Weight - Scale: 58 2 kg (128 lb 4 oz) (06/11/19 1730)  SpO2: 95 % (06/12/19 0752)    Physical Exam   Constitutional: He is oriented to person, place, and time  He appears well-developed  No distress  Frail appearing   HENT:   Head: Normocephalic and atraumatic  Eyes: Pupils are equal, round, and reactive to light  EOM are normal    Neck: Normal range of motion  Neck supple  D cannulated tracheostomy site noted   Cardiovascular: Normal rate and regular rhythm  Pulmonary/Chest: Effort normal    Decreased breath sounds at bases bilaterally   Abdominal:   Right lower quadrant drain in place   Musculoskeletal: Normal range of motion  Neurological: He is alert and oriented to person, place, and time  Skin: Skin is warm  Capillary refill takes less than 2 seconds  Psychiatric: He has a normal mood and affect  His behavior is normal  Judgment and thought content normal    Nursing note and vitals reviewed  Additional Data:   Lab Results: I have personally reviewed pertinent reports  Results from last 7 days   Lab Units 06/12/19  0533   WBC Thousand/uL 15 50*   HEMOGLOBIN g/dL 8 4*   HEMATOCRIT % 25 6*   PLATELETS Thousands/uL 609*   NEUTROS PCT % 79*   LYMPHS PCT % 11*   MONOS PCT % 7   EOS PCT % 2     Results from last 7 days   Lab Units 06/12/19  0533   POTASSIUM mmol/L 3 9   CHLORIDE mmol/L 96*   CO2 mmol/L 31   BUN mg/dL 9   CREATININE mg/dL 0 39*   CALCIUM mg/dL 9 2   ALK PHOS U/L 69   ALT U/L 8   AST U/L 13             No results found for: HGBA1C        Imaging: I have personally reviewed pertinent reports  No orders to display       EKG, Pathology, and Other Studies Reviewed on Admission:   · EKG: n/a    ** Please Note: This note has been constructed using a voice recognition system   **

## 2019-06-12 NOTE — NURSING NOTE
Patient resting comfortably in bed at this time  No signs of distress noted  Patient with PICC line to the left arm for IV antibiotic administration  Patient with deep tissue injury to the right heel encouraged elevation of heel on pillow overnight  Allevyn to the sacrum for skin protection  Patient was encouraged to reposition frequently to promote skin integrity  Two [employer] guards at the bedside at all times as per prisoner protocol  Call bell within reach  Will continue to monitor patient and follow plan of care

## 2019-06-12 NOTE — ASSESSMENT & PLAN NOTE
- no amylase found in records from acute care   - noted to be elevated to 147 (uln 60 at [facility])   - pateint currently asymptomatic and will defer further management to [facility] as patient is being transferred back to [facility] acute care for infxn managment

## 2019-06-13 VITALS
SYSTOLIC BLOOD PRESSURE: 118 MMHG | HEIGHT: 69 IN | DIASTOLIC BLOOD PRESSURE: 60 MMHG | BODY MASS INDEX: 18.99 KG/M2 | TEMPERATURE: 99.7 F | RESPIRATION RATE: 18 BRPM | HEART RATE: 106 BPM | WEIGHT: 128.25 LBS | OXYGEN SATURATION: 96 %

## 2019-06-13 PROBLEM — R91.8 LUNG NODULES: Status: ACTIVE | Noted: 2019-06-13

## 2019-06-13 PROCEDURE — 97542 WHEELCHAIR MNGMENT TRAINING: CPT

## 2019-06-13 PROCEDURE — 99239 HOSP IP/OBS DSCHRG MGMT >30: CPT | Performed by: PHYSICAL MEDICINE & REHABILITATION

## 2019-06-13 PROCEDURE — 97530 THERAPEUTIC ACTIVITIES: CPT

## 2019-06-13 PROCEDURE — 97110 THERAPEUTIC EXERCISES: CPT

## 2019-06-13 PROCEDURE — NC001 PR NO CHARGE: Performed by: PHYSICAL MEDICINE & REHABILITATION

## 2019-06-13 RX ORDER — GABAPENTIN 300 MG/1
300 CAPSULE ORAL 3 TIMES DAILY
COMMUNITY

## 2019-06-13 RX ORDER — CLONIDINE 0.2 MG/24H
1 PATCH, EXTENDED RELEASE TRANSDERMAL WEEKLY
COMMUNITY

## 2019-06-13 RX ADMIN — HEPARIN SODIUM 5000 UNITS: 5000 INJECTION, SOLUTION INTRAVENOUS; SUBCUTANEOUS at 05:23

## 2019-06-13 RX ADMIN — HEPARIN SODIUM 5000 UNITS: 5000 INJECTION, SOLUTION INTRAVENOUS; SUBCUTANEOUS at 13:35

## 2019-06-13 RX ADMIN — METOPROLOL TARTRATE 25 MG: 25 TABLET, FILM COATED ORAL at 20:30

## 2019-06-13 RX ADMIN — OXYCODONE HYDROCHLORIDE 10 MG: 5 TABLET ORAL at 08:34

## 2019-06-13 RX ADMIN — GABAPENTIN 300 MG: 300 CAPSULE ORAL at 15:42

## 2019-06-13 RX ADMIN — GABAPENTIN 300 MG: 300 CAPSULE ORAL at 08:25

## 2019-06-13 RX ADMIN — OXYCODONE HYDROCHLORIDE 10 MG: 5 TABLET ORAL at 04:24

## 2019-06-13 RX ADMIN — OXYCODONE HYDROCHLORIDE 10 MG: 5 TABLET ORAL at 13:12

## 2019-06-13 RX ADMIN — METOPROLOL TARTRATE 25 MG: 25 TABLET, FILM COATED ORAL at 08:24

## 2019-06-13 RX ADMIN — HEPARIN SODIUM 5000 UNITS: 5000 INJECTION, SOLUTION INTRAVENOUS; SUBCUTANEOUS at 21:56

## 2019-06-13 RX ADMIN — DAPTOMYCIN 575 MG: 500 INJECTION, POWDER, LYOPHILIZED, FOR SOLUTION INTRAVENOUS at 20:30

## 2019-06-13 RX ADMIN — OXYCODONE HYDROCHLORIDE 10 MG: 5 TABLET ORAL at 21:56

## 2019-06-13 RX ADMIN — GABAPENTIN 300 MG: 300 CAPSULE ORAL at 20:31

## 2019-06-13 RX ADMIN — OXYCODONE HYDROCHLORIDE 10 MG: 5 TABLET ORAL at 17:29

## 2019-06-13 NOTE — PROGRESS NOTES
06/13/19 1300   Pain Assessment   Pain Assessment 0-10   Pain Type Acute pain   Pain Location Foot   Pain Orientation Right   Restrictions/Precautions   Precautions Cognitive; Fall Risk;Contact/isolation   Weight Bearing Restrictions No   ROM Restrictions No   Cognition   Overall Cognitive Status Impaired   Arousal/Participation Alert; Responsive; Cooperative   Attention Within functional limits   Orientation Level Oriented X4   Memory Within functional limits   Following Commands Follows one step commands without difficulty   QI: Roll Left and Right   Assistance Needed Supervision   Roll Left and Right CARE Score 4   QI: Sit to Lying   Assistance Needed Physical assistance   Assistance Provided by [place] 25%-49%   Comment min assist   Sit to Lying CARE Score 3   QI: Lying to Sitting on Side of Bed   Assistance Needed Physical assistance   Assistance Provided by [place] 25%-49%   Comment min assist   Lying to Sitting on Side of Bed CARE Score 3   QI: Sit to Stand   Assistance Needed Physical assistance   Assistance Provided by [place] 25%-49%   Comment min assist   Sit to Stand CARE Score 3   Bed Mobility   Able to Roll Left to Right;Right to Left;Scoot Up;Scoot Down   Adaptive Equipment Side Rails   Findings supervision   QI: Chair/Bed-to-Chair Transfer   Assistance Needed Physical assistance   Assistance Provided by [place] 25%-49%   Comment min assist   Chair/Bed-to-Chair Transfer CARE Score 3   Transfer Bed/Chair/Wheelchair   Limitations Noted In Balance   Sit Pivot Minimal Assist   Sit to Stand Minimal Assist   Stand to Sit Minimal Assist   Supine to Sit Minimal Assist   Sit to Supine Minimal Assist   Bed, Chair, Wheelchair Transfer (FIM) 4 - Patient completes 75% of all tasks   QI: Car Transfer   Comment using w/c [employer] transport   Reason if not Attempted Activity not applicable   Car Transfer CARE Score 9   QI: Walk 10 Feet   Reason if not Attempted Activity not applicable   Walk 10 Feet CARE Score 9   QI: Walk 50 Feet with Two Turns   Reason if not Attempted Activity not applicable   Walk 50 Feet with Two Turns CARE Score 9   QI: Walk 150 Feet   Reason if not Attempted Activity not applicable   Walk 607 Feet CARE Score 9   QI: Walking 10 Feet on Uneven Surfaces   Reason if not Attempted Activity not applicable   Walking 10 Feet on Uneven Surfaces CARE Score 9   Ambulation   Walking (FIM) 0 - Activity does not occur   QI: Wheel 50 Feet with Two Turns   Assistance Needed Independent   Wheel 50 Feet with Two Turns CARE Score 6   QI: Wheel 150 Feet   Assistance Needed Independent   Wheel 150 Feet CARE Score 6   Wheelchair mobility   QI: Does the patient use a wheelchair? 1  Yes   QI: Indicate the type of wheelchair 1  Manual   Wheelchair Assist Level Modified Independent   Method Right upper extremity; Left upper extremity   Distance Level Surface (feet) 200 ft  (200' 168')   Distance Wheeled 3% Grade 30 ft   Findings level and carpet   Wheelchair (FIM) 6 - Patient wheels 150 feet or more, no rests AND independently, timely and safely   QI: 1 Step (Curb)   Reason if not Attempted Activity not applicable   1 Step (Curb) CARE Score 9   QI: 4 Steps   Reason if not Attempted Activity not applicable   4 Steps CARE Score 9   QI: 12 Steps   Reason if not Attempted Activity not applicable   12 Steps CARE Score 9   Stairs   Stairs (FIM)   (0)   Therapeutic Interventions   Strengthening seated and supine ther ex; occasionally AAROM RLE   Balance transfer training   Other wheelchair mobility   Assessment   Treatment Assessment Patient continues with complaints of pain RLE  Completed ther ex for general LE strengthening  Occasional AAROM RLE needed due to pain and decreased strength  Completed transfer training with focus on sequence and technique for improved balance and safety with functional mobility  PT Barriers   Physical Impairment Decreased strength;Decreased range of motion;Decreased endurance; Impaired balance;Decreased mobility; Decreased safety awareness;Pain   Functional Limitation Walking;Transfers;Standing   Plan   Treatment/Interventions Functional transfer training;LE strengthening/ROM; Therapeutic exercise; Bed mobility   Progress Slow progress, medical status limitations   PT Therapy Minutes   PT Time In 1300   PT Time Out 1432   PT Total Time (minutes) 92   PT Mode of treatment - Individual (minutes) 62   PT Mode of treatment - Concurrent (minutes) 30   PT Mode of treatment - Group (minutes) 0   PT Mode of treatment - Co-treat (minutes) 0   PT Mode of Teatment - Total time(minutes) 92 minutes   Therapy Time missed   Time missed?  No

## 2019-06-13 NOTE — PHYSICAL THERAPY NOTE
PT DISCHARGE SUMMARY:    Patient with change in medical status, resulting in d/c and transfer off [facility] and a return to acute care of another facility  At time of d/c, patient S rolling, MIN A bed mobility, MIN A sit to stand and SPT, MOD I wheelchair mobility level and carpet  Patient non-ambulatory due to pain RLE  D/C to acute care 06/13/19

## 2019-06-13 NOTE — NURSING NOTE
Patient being discharged to acute care facility- [facility] by Dr Saurabh Reis, per physician after discussing CT scan results with I/D from facility  Physician completed discharge instructions  Patient received pain medication for c/o ongoing right foot pain, resting in bed at present time  [employer] officers at bedside

## 2019-06-13 NOTE — QUICK NOTE
Was asked by nursing to review CT scan results, which are listed below:    IMPRESSION:     CT chest:     Small bilateral loculated pleural effusions or empyema, right larger than left     Right inferolateral and left retrocardiac epicardial loculated empyema or abscess, right larger than left      5 8 x 3 4 x 1 5 cm right superior anterolateral pericardial or epicardial abscess      Multi lobar subsegmental atelectasis and/or pneumonia      Several nodules in the right upper and right middle lobe likely postinfectious or fissural lymph nodes  Unknown long-term stability  Based on current Fleischner Society 2017 Guidelines on incidental pulmonary nodule, in this patient who is less than 39  years of age, management decisions should be made on a case by case basis, keeping in mind that infectious causes are more likely than cancer and that use of serial CT should be minimized      CT abdomen and pelvis:     Multiple intra-abdominal, right retroperitoneal, and right inguinal abscesses as described above  In light of above mentioned findings, I would recommend ID at [facility] be notified of findings for their recommendations  If there are any further questions regarding treatment of above mentioned abscesses, would recommend surgical consultation  Case discussed with primary team, Dr Rafia Garduno yesterday

## 2019-06-13 NOTE — NURSING NOTE
CT scan results called onto unit by radiology department with print off  Juanita Payton of hospitalist group notified, reviewed results CT scan and patient's VS  Per hospitalist, will notify and discuss results with Dr Cece Dennis, who saw and evaluated patient on 6/12/19  Primary RN aware and will follow

## 2019-06-13 NOTE — NURSING NOTE
[employer] guards at bedside for my shift  Pt shackled to bed via left leg   Supervisor and security aware we have prisoner with guards

## 2019-06-13 NOTE — PROGRESS NOTES
06/12/19 1953   Charting Type   Charting Type Shift assessment   Neurological   Neuro (WDL) X   Level of Consciousness Alert/awake   Orientation Level Oriented X4   Cognition Follows commands   Extraocular Movements Full   Speech Clear; Appropriate for developmental age   [de-identified] Able to swallow solids and liquids without difficulty   RUE Muscle Strength 5- Normal strength   LUE Muscle Strength 5- Normal strength   RLE Muscle Strength 2- Movement with gravity eliminated   LLE Muscle Strength 5- Normal strength   HEENT   HEENT (WDL) WDL   Neck Other (Comment)  (old trach site)   Respiratory   Respiratory (WDL) X   Respiratory Pattern Normal   Chest Assessment Chest expansion symmetrical   Bilateral Breath Sounds Clear;Diminished   R Breath Sounds Clear;Diminished   L Breath Sounds Clear;Diminished   Respiratory Additional Assessments No   Cardiac   Cardiac (WDL) WDL   Peripheral Vascular   Peripheral Vascular (WDL) X   Cyanosis None   Pulses R pedal;L pedal   Integumentary   Integumentary (WDL) X   Skin Color Appropriate for ethnicity;Pale   Skin Condition/Temp Dry   Skin Integrity Cracking   Skin Location B/L feet   Skin Turgor Non-tenting   Integumentary Additional Assessments No   Bj Scale   Sensory Perceptions 3   Moisture 4   Activity 2   Mobility 3   Nutrition 3   Friction and Shear 1   Bj Scale Score 16   Wound 06/11/19 Pressure Injury Sacrum   Date First Assessed/Time First Assessed: 06/11/19 1800   Pre-Existing Wound: Yes  Primary Wound Type: Pressure Injury  Location: Sacrum  Wound Description (Comments): red, nonblanchable   Wound Description Pink   Eleanor-wound Assessment Clean;Dry; Intact   Drainage Amount None   Treatments Cleansed   Dressing Foam, Silicon (eg  Allevyn, etc)   Dressing Changed New   Patient Tolerance Tolerated well   Dressing Status Clean;Dry; Intact   Wound 06/11/19 Pressure Injury Heel Right   Date First Assessed/Time First Assessed: 06/11/19 1800   Pre-Existing Wound:  Yes Primary Wound Type: Pressure Injury  Location: Heel  Wound Location Orientation: Right  Wound Description (Comments): SDTI   Wound Description Dry; Intact; Pink   Staging Deep Tissue Injury   Eleanor-wound Assessment Clean;Dry; Intact; Pink   Drainage Amount None   Dressing Status Other (Comment)  (ERNIE)   Wound 06/11/19 Incision Catheter entry/exit site Abdomen Right; Lower;Quadrant   Date First Assessed/Time First Assessed: 06/11/19 1800   Pre-Existing Wound: Yes  Primary Wound Type: Incision  Traumatic Wound Type: Catheter entry/exit site  Location: Abdomen  Wound Location Orientation: Right; Lower;Quadrant   Wound Description Clean;Dry; Intact; Other (Comment)   Eleanor-wound Assessment Clean;Dry; Intact   Closure Surface sutures   Drainage Amount None   Drainage Description Yellow;Purulent   Dressing Status Clean;Dry; Intact   Musculoskeletal   Musculoskeletal (WDL) X   Level of Assistance Moderate assist, patient does 50-74%   RUE Full movement   LUE Full movement   RLE Limited movement   LLE Full movement   Gastrointestinal   Gastrointestinal (WDL) X   Abdomen Inspection Flat   Stool Assessment   Bowel Incontinence No   Genitourinary   Genitourinary (WDL) WDL   Psychosocial   Psychosocial (WDL) X   Patient Behaviors/Mood Cooperative;Pleasant   Needs Expressed Denies   Ability to Express Feelings Able to express   Ability to Express Needs Able to express   Ability to Express Thoughts Able to express   Ability to Understand Others Understands   Cough   Cough Non-productive

## 2019-06-13 NOTE — CASE MANAGEMENT
[facility] physician recommends Pt be transferred back to [facility] due to test results/current medical status  SW spoke with [facility] coordinator, Sulema Bennett [phone], and [facility], Winnie Caldwell  Ms Shreyas George is arranging [facility] transport & security to take the Pt to [facility] ASAP & will call the nurse's station in [facility] with a PU time

## 2019-06-13 NOTE — ASSESSMENT & PLAN NOTE
- multiple lung/subpleural nodules noted on CT of 6/13 however will defer further management to [facility] physicians as patient is pending transfer back to [facility] acute care for infxn managment

## 2019-06-13 NOTE — MALNUTRITION/BMI
This medical record reflects one or more clinical indicators suggestive of malnutrition and/or morbid obesity  Severe PCM of acute illness r/t unintended wt loss, prolonged poor intake & subcutaneous fat/muscle loss as evidenced by ~22% wt loss x~1 month, intake <50% of needs >5 days PTA & temporal wasting and dark, hollow orbitals  Malnutrition Findings:   Malnutrition type: Acute illness  Degree of Malnutrition: Other severe protein calorie malnutrition  Malnutrition Characteristics: Fat loss, Muscle loss, Inadequate energy, Weight loss    BMI Findings: Body mass index is 18 94 kg/m²  See Nutrition note dated 6/13/19 for additional details  Completed nutrition assessment is viewable in the nutrition documentation

## 2019-06-13 NOTE — DISCHARGE SUMMARY
Physical Medicine and Rehabilitation Progress Note  Nigel Sigala 25 y o  male MRN: [id number]  Unit/Bed#: [facility] 221-01 Encounter: [id number]    Discharge Summary - PMR           Admission Date:    6/11/19  Discharge Date:   6/14/19    Diagnosis:   Multiple abscess        Condition at Discharge: stable    Discharge instructions/Information to patient and family:   See after visit summary for information provided to patient and family  Provisions for Follow-Up Care:  See after visit summary for information related to follow-up care and any pertinent home health orders  Disposition: [facility] acute care     Planned Readmission: no        Discharge Medications:  See after visit summary for reconciled discharge medications provided to patient and family  Comorbidities:   See below for medical details     Hospital Course: The patient had an unremarkable rehab course with significant functional gains made, please see below for medical details:         Nigel Sigala is a 25 y o  male who presented after being found down and was found to have rhabdomyolysis and TONY necessitating HD, additionally patient's course complicated by MRSA bacteremia and psoas abscess necessitating abx as well as irrigation, evacuation, and drain placement; on 6/5 patient had rt thoracentesis for pleural effusion vs empyema;  patient also developed respiratory failure and had to be intubated and eventually had tracheostomy placed and was eventually decanulated   Patient was then transferred to acute rehab where he developed fevers and upon d/w ID at [facility] (Dr Richard Mendoza) a CT C/A/P was recommended which revealed multiple fluid collection in the chest and abdominal cavity therefore per d/w IM and ID it was recommended patient be transferred back to [facility] acute care             Hypomagnesemia  Assessment & Plan  - per acute care records 1 2 on 5/18; recheck 1 8 however will defer to [address] [place] to start Mg supplementation as patient is pending tx back to [facility] acute care for management of infxn    Thrombocytosis ([address] )  Assessment & Plan  - likely reactive  - currently 609  -pending tx back to [facility] acute care for infxn managment    Anemia  Assessment & Plan  - likely component of ABLA  - Hg 7 9 per acute care records on 6/6 currently stable at 8 4  -pending tx back to [facility] acute care for infxn managment    Lung nodules  Assessment & Plan  - multiple lung/subpleural nodules noted on CT of 6/13 however will defer further management to [facility] physicians as patient is pending transfer back to [facility] for infxn managment    Elevated lipase  Assessment & Plan  - no amylase found in records from acute care   - noted to be elevated to 147 (uln 60 at [facility])   - pateint currently asymptomatic and will defer further management to [facility] as patient is being transferred back to [facility] care for infxn managment    Right leg weakness  Assessment & Plan  -occurred in acute care and present on admission to rehab unit   - patient with 1/5 RLE strength proximally and right foot drop  - PROM dorsiflexion to a few degrees shy of neutral (ROM and multipodis boot to prevent further contracture)   - etiology likely multifactorial due to a combination of patient with RLE pain (primarily at the right foot) and is s/p irrigation, evacuation, and drain placement for a right psoas abscess and initially presented with rhabdomyolysis after being found down (likely was on his right side) having had a prolonged hospital course is likely deconditioned as well  -given that patient has an abscess in the illiopsoas region patient could also have a lumbar plexopathy as the etiology however MRI L spine of 5/26 at [facility] showed no disc herneations, central stenosis, foraminal stenosis L1 through S1 but report did note an "Abnormal enhancement extends adjacent to multiple right-sided lumbar neural foramina " therefore this finding was d/w the radiologist at [place] who felt while there was no compression and no discrete fluid collection that could be drained there was evidence of that the aforementioned abscess associated fluid by extending to the lumbar neural foramina (particularly L2-L4) could be causing inflammation and patient's weakness   - t/c EMG/NCS if improvement does not occur (not available as inpt at this facility)     Right leg pain  Assessment & Plan  - primarily located at foot with neuropathic description of pain by patient   - unclear etiology but may have preceded hospitalization   - no obvious deformity, trophic changes, vasomotor abnormalities, or sudomotor abnormalities but patient does display allodynia and has weakness and decreased ROM at the ankle but no h/o of direct trauma to that area but did have a right psoas abscess for which underwent a procedure further proximally to that area; unclear if this is early CRPS vs a peripheral neuropathy vs a radicular pain syndrome vs lumbar plexopathy  -MRI L spine of 5/26 at [place] showed no disc herneations, central stenosis, foraminal stenosis L1 through S1 but report did note an "Abnormal enhancement extends adjacent to multiple right-sided lumbar neural foramina " therefore this finding was d/w the radiologist at [place] who felt while there was no compression and no discrete fluid collection that could be drained there was evidence of that the aforementioned abscess associated fluid by extending to the lumbar neural foramina (particularly L2-L4) could be causing inflammation and patient's pain  - on gabapentin which was started in acute care and will uptitrate as needed  - also on prn oxycodone which was also started on acute care however after d/w patient this is of unclear benefit and given h/o substance abuse there is concern for drug seeking       HCV (hepatitis C virus)  Assessment & Plan  - seen by ID in acute care and recommended OP FU  - per acute care records AST/ALT/AP/total & direct bilirubin were 23/13/88/0 6/0 3 which are all WNL per their reference ranges  - recheck on 6/12 revealed AST/ALT/AP/total & direct bilirubin were all WNL     Hx of tracheostomy  Assessment & Plan  - tracheostomy performed by Dr Dwain Cooks on 5/9  - now decanulated   - site C/D/I    Rhabdomyolysis  Assessment & Plan  - resolved   - renal fxn WNL   - CK now actually below LLN of 30 at 15 which is likely 2/2 to disuse atrophy     Fever  Assessment & Plan  -blood cx orderd by IM which is pending  - 16 2 per acute care records on 6/6; currently stable at 15 5   - with peritoneal & retroperitoneal / right iliopsoas abscesses   - and based on radiology report from [facility] of [address] A/P of 5/31 patient may have mediastinal abscess as well and FU CT chest was recommended however this was not done at [facility]; additionally the report mentions a possible rt empyema however per d/w Dr Cele Alston of ID at [facility] patient had rt thoracentesis on 6/5 which did not grow any organisms in culture  - with MRSA bacteremia while in acute care   - s/p irrigation, evacuation, and drain placement on 5/8 by Dr Tayler Wolf of iliopsosas abscess; per acute care records patient will need FU with IR for drain check approximately 6/25  - per d/w Dr Fei Harmon of ID recommend continue with daptomycin 10 mg/kg/day until 6/28 (started on abx on 5/6 and confirmed with pharmacy that 10 mg/kg/day does not exceed the maximum dose) and check CT C/A/P to evaluate possible mediastinal abscess as well as known peritoneal & retroperitoneal abscesses (d/w radiology and recommends CT with both oral and IV contrast to evaluate for abscesses and patient currently with eGFR of 161) which revealed multiple lung/subpleural nodules, multiple fluid collections like infective located in the retroperitoneal, peritoneal, thoracic space (including but not limited to around the liver, heart, and lungs)--> discussed these findings with Dr Patric Chirinos of ID at [facility] and IM here at [address] and all are in agreement patient should be transferred back to [facility] acute care           A-fib [facility])  Assessment & Plan    - evaluated by cardiology in acute care and started on lopressor 25 mg q12 but not AC based on MALI score of 0 per cards note from acute care  - d/w IM and while A-fib may have been provoked by illness in acute care recommend that patient be continued on lopressor 25 mg q12 at this time     * HTN (hypertension)  Assessment & Plan  - on clonidine 0 2 mg TD patch qweekly (arrived with patch dated 6/10)  - on lopressor 25 mg q12 (for a-fib)       Scheduled Meds:    Current Facility-Administered Medications:  [START ON 6/17/2019] cloNIDine 0 2 mg Transdermal Weekly Georgie Clancy MD    DAPTOmycin 10 mg/kg Intravenous Q24H Georgie Clancy MD Last Rate: 575 mg (06/12/19 2021)   gabapentin 300 mg Oral TID Georgie Clancy MD    heparin (porcine) 5,000 Units Subcutaneous Q8H Austen Acevedo MD    metoprolol tartrate 25 mg Oral Q12H [address] Georgie Clancy MD    oxyCODONE 10 mg Oral Q4H PRN Georgie Clancy MD    oxyCODONE 5 mg Oral Q4H PRN Georgie Clancy MD    polyethylene glycol 17 g Oral Daily PRN Georgie Clancy MD         Incidental findings:  1) elevated triglycerides: nutritionist--> dietary education; OP FU with PCP with further testing/treatment and/or specialist referral at PCP's discretion   2) L1, L5, S1, S2 vertebral body hemagiomas: no central/foraminal impingement or concern for instability/compression deformity per d/w radiologist a [facility], OP FU with PCP with further testing/treatment and/or specialist referral at PCP's discretion     Note: contacted [facility] for transfer and per request of physician at [facility] (Dr Gabby Bennett) patient is being sent to [facility] ER where the patient will be then admitted to acute care    DVT ppx: HSQ     * 33 min was spent in preparation for patient discharge including discussion with patient, patient's nurse, therapy staff, and case management

## 2019-06-13 NOTE — CASE MANAGEMENT
SW spoke with Lt  Fermin Julio from [facility] transport arranged for approx 5 PM with security to accompany Pt to [facility] ER

## 2019-06-13 NOTE — PROGRESS NOTES
Physical Medicine and Rehabilitation Progress Note  Nigel Sigala 25 y o  male MRN: [id number]  Unit/Bed#: -01 Encounter: [id number]    HPI:   Nigel Sigala is a 25 y o  male who presented after being found down and was found to have rhabdomyolysis and TONY necessitating HD, additionally patient's course complicated by MRSA bacteremia and psoas abscess necessitating abx as well as irrigation, evacuation, and drain placement; on 6/5 patient had rt thoracentesis for pleural effusion vs empyema;  patient also developed respiratory failure and had to be intubated and eventually had tracheostomy placed and was eventually decanulated  Patient was then transferred to acute rehab where he developed fevers and upon d/w ID at [facility] (Dr Richard Mendoza) a CT C/A/P was recommended which revealed multiple fluid collection in the chest and abdominal cavity therefore per d/w IM and ID it was recommended patient be transferred back to [facility]  Chief Complaint: debility     Interval/subjective: d/w patient plan for transfer back to [facility] and reasons for it     ROS: A 10 point ROS was performed; negative except as noted above       Assessment/Plan:      Hypomagnesemia  Assessment & Plan  - per acute care records 1 2 on 5/18; recheck 1 8 however will defer to [facility] to start Mg supplementation as patient is pending tx back to [facility] for management of infxn    Thrombocytosis ([facility] [address] )  Assessment & Plan  - likely reactive  - currently 609  -pending tx back to [facility] acute [facility] for infxn managment    Anemia  Assessment & Plan  - likely component of ABLA  - Hg 7 9 per acute care records on 6/6 currently stable at 8 4  -pending tx back to [facility] acute care for infxn managment    Lung nodules  Assessment & Plan  - multiple lung/subpleural nodules noted on CT of 6/13 however will defer further management to [facility] physicians as patient is pending transfer back to [facility] acute care for infxn managment    Elevated lipase  Assessment & Plan  - no amylase found in records from acute care   - noted to be elevated to 147 (uln 60 at [facility])   - pateint currently asymptomatic and will defer further management to [facility] as patient is being transferred back to [facility] acute care for infxn managment    Right leg weakness  Assessment & Plan  -occurred in acute care and present on admission to rehab unit   - patient with 1/5 RLE strength proximally and right foot drop  - PROM dorsiflexion to a few degrees shy of neutral (ROM and multipodis boot to prevent further contracture)   - etiology likely multifactorial due to a combination of patient with RLE pain (primarily at the right foot) and is s/p irrigation, evacuation, and drain placement for a right psoas abscess and initially presented with rhabdomyolysis after being found down (likely was on his right side) having had a prolonged hospital course is likely deconditioned as well  -given that patient has an abscess in the illiopsoas region patient could also have a lumbar plexopathy as the etiology however MRI L spine of 5/26 at [facility] showed no disc herneations, central stenosis, foraminal stenosis L1 through S1 but report did note an "Abnormal enhancement extends adjacent to multiple right-sided lumbar neural foramina " therefore this finding was d/w the radiologist at [facility] who felt while there was no compression and no discrete fluid collection that could be drained there was evidence of that the aforementioned abscess associated fluid by extending to the lumbar neural foramina (particularly L2-L4) could be causing inflammation and patient's weakness   - t/c EMG/NCS if improvement does not occur (not available as inpt at this facility)     Right leg pain  Assessment & Plan  - primarily located at foot with neuropathic description of pain by patient   - unclear etiology but may have preceded hospitalization   - no obvious deformity, trophic changes, vasomotor abnormalities, or sudomotor abnormalities but patient does display allodynia and has weakness and decreased ROM at the ankle but no h/o of direct trauma to that area but did have a right psoas abscess for which underwent a procedure further proximally to that area; unclear if this is early CRPS vs a peripheral neuropathy vs a radicular pain syndrome vs lumbar plexopathy  -MRI L spine of 5/26 at [facility] showed no disc herneations, central stenosis, foraminal stenosis L1 through S1 but report did note an "Abnormal enhancement extends adjacent to multiple right-sided lumbar neural foramina " therefore this finding was d/w the radiologist at [facility] who felt while there was no compression and no discrete fluid collection that could be drained there was evidence of that the aforementioned abscess associated fluid by extending to the lumbar neural foramina (particularly L2-L4) could be causing inflammation and patient's pain  - on gabapentin which was started in acute care and will uptitrate as needed  - also on prn oxycodone which was also started on acute care however after d/w patient this is of unclear benefit and given h/o substance abuse there is concern for drug seeking       HCV (hepatitis C virus)  Assessment & Plan  - seen by ID in acute care and recommended OP FU  - per acute care records AST/ALT/AP/total & direct bilirubin were 23/13/88/0 6/0 3 which are all WNL per their reference ranges  - recheck on 6/12 revealed AST/ALT/AP/total & direct bilirubin were all WNL     Hx of tracheostomy  Assessment & Plan  - tracheostomy performed by Dr Niall Huynh on 5/9  - now decanulated   - site C/D/I    Rhabdomyolysis  Assessment & Plan  - resolved   - renal fxn WNL   - CK now actually below LLN of 30 at 15 which is likely 2/2 to disuse atrophy     Fever  Assessment & Plan  -blood cx orderd by IM which is pending  - 16 2 per acute care records on 6/6; currently stable at 15 5   - with peritoneal & retroperitoneal / right iliopsoas abscesses   - and based on radiology report from [facility] of [address] A/P of 5/31 patient may have mediastinal abscess as well and FU CT chest was recommended however this was not done at [facility]; additionally the report mentions a possible rt empyema however per d/w Dr Abi Gill of ID at [facility] patient had rt thoracentesis on 6/5 which did not grow any organisms in culture  - with MRSA bacteremia while in acute care   - s/p irrigation, evacuation, and drain placement on 5/8 by Dr Siva Weaver of iliopsosas abscess; per acute care records patient will need FU with IR for drain check approximately 6/25  - per d/w Dr Jamari Harper of ID recommend continue with daptomycin 10 mg/kg/day until 6/28 (started on abx on 5/6 and confirmed with pharmacy that 10 mg/kg/day does not exceed the maximum dose) and check CT C/A/P to evaluate possible mediastinal abscess as well as known peritoneal & retroperitoneal abscesses (d/w radiology and recommends CT with both oral and IV contrast to evaluate for abscesses and patient currently with eGFR of 161) which revealed multiple lung/subpleural nodules, multiple fluid collections like infective located in the retroperitoneal, peritoneal, thoracic space (including but not limited to around the liver, heart, and lungs)--> discussed these findings with Dr Jamari Harper of ID at [facility] and IM here at [address] and all are in agreement patient should be transferred back to [facility] acute care           A-fib [facility])  Assessment & Plan    - evaluated by cardiology in acute care and started on lopressor 25 mg q12 but not AC based on MALI score of 0 per cards note from acute care  - d/w IM and while A-fib may have been provoked by illness in acute care recommend that patient be continued on lopressor 25 mg q12 at this time     * HTN (hypertension)  Assessment & Plan  - on clonidine 0 2 mg TD patch qweekly (arrived with patch dated 6/10)  - on lopressor 25 mg q12 (for a-fib)       Scheduled Meds:    Current Facility-Administered Medications:  [START ON 6/17/2019] cloNIDine 0 2 mg Transdermal Weekly Dennise Mcgregor MD    DAPTOmycin 10 mg/kg Intravenous Q24H Dennise Mcgregor MD Last Rate: 575 mg (06/12/19 2021)   gabapentin 300 mg Oral TID Dennise Mcgregor MD    heparin (porcine) 5,000 Units Subcutaneous Q8H Austen Acevedo MD    metoprolol tartrate 25 mg Oral Q12H [address] Dennise Mcgregor MD    oxyCODONE 10 mg Oral Q4H PRN Dennise Mcgregor MD    oxyCODONE 5 mg Oral Q4H PRN Dennise Mcgregor MD    polyethylene glycol 17 g Oral Daily PRN Dennise Mcgregor MD         Incidental findings:  1) elevated triglycerides: nutritionist--> dietary education; OP FU with PCP with further testing/treatment and/or specialist referral at PCP's discretion   2) L1, L5, S1, S2 vertebral body hemagiomas: no central/foraminal impingement or concern for instability/compression deformity [facility], OP FU with PCP with further testing/treatment and/or specialist referral at PCP's discretion     Note: [facility] and [facility] (Dr Venecia Ledbetter) [facility] where the patient will be then admitted to acute care    DVT ppx: HSQ     Objective:          Physical Exam:        General: alert, no apparent distress, cooperative and comfortable  HEENT:  hx of tracheostomy s/p decanulation   CARDIAC:  +S1/2  LUNGS:  respirations unlabored   ABDOMEN:  + RLQ drain   EXTREMITIES:  no significant LE edema  NEURO:   mental status, speech normal, alert and oriented x3  PSYCH:  mood/affect currenlty stable      Laboratory:   Results from last 7 days   Lab Units 06/12/19  0533   HEMOGLOBIN g/dL 8 4*   HEMATOCRIT % 25 6*   WBC Thousand/uL 15 50*     Results from last 7 days   Lab Units 06/12/19  0533   BUN mg/dL 9   SODIUM mmol/L 134   POTASSIUM mmol/L 3 9   CHLORIDE mmol/L 96*   CREATININE mg/dL 0 39*   AST U/L 13   ALT U/L 8            Patient Active Problem List   Diagnosis    HTN (hypertension)    A-fib (HCC)    Fever    Rhabdomyolysis    Hx of tracheostomy    HCV (hepatitis C virus)    Anemia    Thrombocytosis (HCC)    Right leg pain    Right leg weakness    Hypomagnesemia    Elevated lipase    Lung nodules

## 2019-06-13 NOTE — NURSING NOTE
Dr Marcel Warren contacted nursing, reported discussing patient with Dr Conrado Jerez 6/12, to notify I/D [facility] and send results there per I/D notes  Dr Conrado Jerez notified of CT results, stated not to fax results to I/D, will discuss them

## 2019-06-13 NOTE — OCCUPATIONAL THERAPY NOTE
OT DISCHARGE SUMMARY    Pt with change in medical status, consequential d/c to acute at [facility] as per MD orders  At time of d/c, pt required physical assist with ADL tasks 2* generalized pain and weakness, did not meet goals of mod I  Pt was participating in ADL training, therapeutic exercises, therapeutic activities, functional mobility/tranfsers, and activity/standing tolerance at time of d/c  Continues with barriers of decreased standing/activity tolerance, generalized pain and decreased strength, mildly impaired cognition, decreased independence in self care, and decreased balance/mobility  Once medically stable, pt would benefit from continued skilled therapy services in order to progress closer to mod I ADL goals       Oumar Manjarrez MS, OTR/L

## 2019-06-13 NOTE — PROGRESS NOTES
06/13/19 1005   Pain Assessment   Pain Assessment 0-10   Pain Score 6   Pain Type Acute pain   Pain Location Foot   Pain Orientation Right   Restrictions/Precautions   Precautions Cognitive; Fall Risk;Contact/isolation   Weight Bearing Restrictions No   ROM Restrictions No   Eating Assessment   Findings set up soda in cup with ice, lid, and straw after OT brought materials   Transfer Bed/Chair/Wheelchair   Limitations Noted In Balance; Endurance;Pain Management;LE Strength   Stand Pivot Minimal Assist   Sit to Stand Minimal   Stand to Sit Minimal   Supine to Sit Moderate Assist   Sit to Supine Minimal   Bed, Chair, Wheelchair Transfer (FIM) 3 - Patient completes 50 - 74% of all tasks   Toileting   Able to Pull Clothing   (pushed pants opening to side)   Able to Manage Clothing Closures Yes   Manage Hygiene Bladder   Limitations Noted In Balance;ROM;Safety;LE Strength;Problem Solving   Adaptive Equipment   (urinal while laying supine)   Toileting (FIM) 5 - [place] sets up supplies or applies device   Functional Standing Tolerance   Time 45s during clothespin hanging activity; attempted to stand again after this trial, however unable to tolerate   Transfers   Sit to Stand 4  Minimal assistance   Stand to Sit 4  Minimal assistance   Therapeutic Exercise - ROM   UE-ROM Yes   ROM- Right Upper Extremities   RUE ROM Comment arm bike 3 5 minutes; reached to hang clothespins and remove while seated; reached to put playing cards in matching pockets while seated; used reacher to retrieve wooden pegs from floor and place in basket on table   ROM - Left Upper Extremities    LUE ROM Comment arm bike 3 5 minutes; reached to hang clothespins and remove while seated; reached to put playing cards in matching pockets while seated; used reacher to retrieve wooden pegs from floor and place in basket on table   Therapeutic Excerise-Strength   UE Strength Yes   Right Upper Extremity- Strength   RUE Strength Comment searched for and retrieved small objects in red theraputty; 2x15 using 2# dumbbell: elbow flexion/extension, protraction/retraction, ABD/ADD, shoulder flexion/extension   Left Upper Extremity-Strength   LUE Strength Comment searched for and retrieved small objects in red theraputty; 2x15 using 2# dumbbell: elbow flexion/extension, protraction/retraction, ABD/ADD, shoulder flexion/extension   Exercise Tools   Exercise Tools   (arm bike, 2# dumbbell, red putty)   Cognition   Overall Cognitive Status Impaired   Arousal/Participation Alert; Responsive; Cooperative   Attention Within functional limits   Orientation Level Oriented X4   Memory Within functional limits   Following Commands Follows one step commands without difficulty   Activity Tolerance   Activity Tolerance Patient limited by pain  (in R foot)   Assessment   Treatment Assessment Pt agreeable to OT session this AM  Received lying supine in bed  Transfers with [place]/CG  UE ROM/strength exercise details listed in respective sections  Rest breaks taken as needed  Pt unable to stand x2 trials during clothespin matching activity, however was able to stand 45s during one trial  Pt would benefit from continued skilled OT services in order to maximize ROM/strength, activity/standing tolerance, and functional transfers  Prognosis Good   Problem List Decreased strength;Decreased range of motion;Decreased endurance; Impaired balance;Decreased mobility; Impaired judgement;Pain   Plan   Treatment/Interventions ADL retraining;Functional transfer training;LE strengthening/ROM; Endurance training; Therapeutic exercise;Patient/family training;Equipment eval/education; Compensatory technique education;OT   Progress Slow progress, decreased activity tolerance   OT Therapy Minutes   OT Time In 1005   OT Time Out 1136   OT Total Time (minutes) 91   OT Mode of treatment - Individual (minutes) 54   OT Mode of treatment - Concurrent (minutes) 37   Therapy Time missed   Time missed?  No

## 2019-06-14 NOTE — OCCUPATIONAL THERAPY NOTE
Discharge Summary    Pt participates in 2 sessions of skilled oT services prior to transfer back to acute care secondary to decreased medical status  Pt requires assist due to decreased balance with increased pain and decreased WB RLE, decreased safety, and endurance  Pt's current LOF as follows: Setup for grooming, mod A bathing, min A UB dressing, max A LB dressing and supervision with urinal use only  Pt would benefit from continued skilled OT services however was discharges back to acute care 6/13/19 goals unmet

## 2019-06-14 NOTE — NURSING NOTE
Pt discharged to go to [facility] via [facility]  Guards with  Report to Abdiaziz Keen at [facility] ED where pt  Is being taken to pt did not have any belongings with him  Bashir Prather

## 2019-06-17 LAB — BACTERIA BLD CULT: NORMAL

## 2020-02-18 NOTE — DISCHARGE INSTRUCTIONS
Discharge Summary for Lacie Valenzuela is a 25 y o  male who presented after being found down and was found to have rhabdomyolysis and TONY necessitating HD, additionally patient's course complicated by MRSA bacteremia and psoas abscess necessitating abx as well as irrigation, evacuation, and drain placement; on 6/5 patient had rt thoracentesis for pleural effusion vs empyema;  patient also developed respiratory failure and had to be intubated and eventually had tracheostomy placed and was eventually decanulated   Patient was then transferred to acute rehab where he developed fevers and upon d/w ID at [facility] (Dr Fei Harmon) a CT C/A/P was recommended which revealed multiple fluid collection in the chest and abdominal cavity therefore per d/w IM and ID it was recommended patient be transferred back to [facility] acute care                    Hypomagnesemia  Assessment & Plan  - per acute care records 1 2 on 5/18; recheck 1 8 however will defer to [facility] to start Mg supplementation as patient is pending tx back to [facility] acute care for management of infxn     Thrombocytosis ([facility] [address] )  Assessment & Plan  - likely reactive  - currently 609  -pending tx back to [facility] acute care for infxn managment     Anemia  Assessment & Plan  - likely component of ABLA  - Hg 7 9 per acute care records on 6/6 currently stable at 8 4  -pending tx back to [facility] acute care for infxn managment     Lung nodules  Assessment & Plan  - multiple lung/subpleural nodules noted on CT of 6/13 however will defer further management to [facility] physicians as patient is pending transfer back to [facility] acute care for infxn managment     Elevated lipase  Assessment & Plan  - no amylase found in records from acute care   - noted to be elevated to 147 (uln 60 at [facility])   - pateint currently asymptomatic and will defer further management to [facility] as patient is being transferred back to Pt will be scheduled for labs and f/u in September at [facility].     Tahir Faulkner RN [facility] for infxn managment     Right leg weakness  Assessment & Plan  -occurred in acute care and present on admission to rehab unit   - patient with 1/5 RLE strength proximally and right foot drop  - PROM dorsiflexion to a few degrees shy of neutral (ROM and multipodis boot to prevent further contracture)   - etiology likely multifactorial due to a combination of patient with RLE pain (primarily at the right foot) and is s/p irrigation, evacuation, and drain placement for a right psoas abscess and initially presented with rhabdomyolysis after being found down (likely was on his right side) having had a prolonged hospital course is likely deconditioned as well  -given that patient has an abscess in the illiopsoas region patient could also have a lumbar plexopathy as the etiology however MRI L spine of 5/26 at [facility] showed no disc herneations, central stenosis, foraminal stenosis L1 through S1 but report did note an "Abnormal enhancement extends adjacent to multiple right-sided lumbar neural foramina " therefore this finding was d/w the radiologist at [facility] who felt while there was no compression and no discrete fluid collection that could be drained there was evidence of that the aforementioned abscess associated fluid by extending to the lumbar neural foramina (particularly L2-L4) could be causing inflammation and patient's weakness   - t/c EMG/NCS if improvement does not occur (not available as inpt at this facility)      Right leg pain  Assessment & Plan  - primarily located at foot with neuropathic description of pain by patient   - unclear etiology but may have preceded hospitalization   - no obvious deformity, trophic changes, vasomotor abnormalities, or sudomotor abnormalities but patient does display allodynia and has weakness and decreased ROM at the ankle but no h/o of direct trauma to that area but did have a right psoas abscess for which underwent a procedure further proximally to that area; unclear if this is early CRPS vs a peripheral neuropathy vs a radicular pain syndrome vs lumbar plexopathy  -MRI L spine of 5/26 at [facility] showed no disc herneations, central stenosis, foraminal stenosis L1 through S1 but report did note an "Abnormal enhancement extends adjacent to multiple right-sided lumbar neural foramina " therefore this finding was d/w the radiologist at [facility] who felt while there was no compression and no discrete fluid collection that could be drained there was evidence of that the aforementioned abscess associated fluid by extending to the lumbar neural foramina (particularly L2-L4) could be causing inflammation and patient's pain  - on gabapentin which was started in acute care and will uptitrate as needed  - also on prn oxycodone which was also started on acute care however after d/w patient this is of unclear benefit and given h/o substance abuse there is concern for drug seeking         HCV (hepatitis C virus)  Assessment & Plan  - seen by ID in acute care and recommended OP FU  - per acute care records AST/ALT/AP/total & direct bilirubin were 23/13/88/0 6/0 3 which are all WNL per their reference ranges  - recheck on 6/12 revealed AST/ALT/AP/total & direct bilirubin were all WNL      Hx of tracheostomy  Assessment & Plan  - tracheostomy performed by Dr Frederic Cobian on 5/9  - now decanulated   - site C/D/I     Rhabdomyolysis  Assessment & Plan  - resolved   - renal fxn WNL   - CK now actually below LLN of 30 at 15 which is likely 2/2 to disuse atrophy      Fever  Assessment & Plan  -blood cx orderd by IM which is pending  - 16 2 per acute care records on 6/6; currently stable at 15 5   - with peritoneal & retroperitoneal / right iliopsoas abscesses   - and based on radiology report from [facility] of [address] A/P of 5/31 patient may have mediastinal abscess as well and FU CT chest was recommended however this was not done at [facility]; additionally the report mentions a possible rt empyema however per d/w Dr Loree Del Castillo of ID at [facility] patient had rt thoracentesis on 6/5 which did not grow any organisms in culture  - with MRSA bacteremia while in acute care   - s/p irrigation, evacuation, and drain placement on 5/8 by Dr Isma Guerrero of iliopsosas abscess; per acute care records patient will need FU with IR for drain check approximately 6/25  - per d/w Dr Claudell Mode of ID recommend continue with daptomycin 10 mg/kg/day until 6/28 (started on abx on 5/6 and confirmed with pharmacy that 10 mg/kg/day does not exceed the maximum dose) and check CT C/A/P to evaluate possible mediastinal abscess as well as known peritoneal & retroperitoneal abscesses (d/w radiology and recommends CT with both oral and IV contrast to evaluate for abscesses and patient currently with eGFR of 161) which revealed multiple lung/subpleural nodules, multiple fluid collections like infective located in the retroperitoneal, peritoneal, thoracic space (including but not limited to around the liver, heart, and lungs)--> discussed these findings with Dr Claudell Mode of ID at [facility] and IM here at [address] [place] [address] and all are in agreement patient should be transferred back to [facility] acute care             A-fib [facility])  Assessment & Plan     - evaluated by cardiology in acute care and started on lopressor 25 mg q12 but not AC based on MALI score of 0 per cards note from acute care  - d/w IM and while A-fib may have been provoked by illness in acute care recommend that patient be continued on lopressor 25 mg q12 at this time      * HTN (hypertension)  Assessment & Plan  - on clonidine 0 2 mg TD patch qweekly (arrived with patch dated 6/10)  - on lopressor 25 mg q12 (for a-fib)                Incidental findings:  1) elevated triglycerides: nutritionist--> dietary education; OP FU with PCP with further testing/treatment and/or specialist referral at PCP's discretion   2) L1, L5, S1, S2 vertebral body hemagiomas: no central/foraminal impingement or concern for instability/compression deformity per d/w radiologist leandro Vazquez, OP FU with PCP with further testing/treatment and/or specialist referral at PCP's discretion